# Patient Record
Sex: FEMALE | Race: WHITE | NOT HISPANIC OR LATINO | URBAN - METROPOLITAN AREA
[De-identification: names, ages, dates, MRNs, and addresses within clinical notes are randomized per-mention and may not be internally consistent; named-entity substitution may affect disease eponyms.]

---

## 2017-01-09 ENCOUNTER — OFFICE VISIT (OUTPATIENT)
Dept: PULMONOLOGY | Facility: HOSPICE | Age: 72
End: 2017-01-09
Attending: INTERNAL MEDICINE
Payer: MEDICARE

## 2017-01-09 ENCOUNTER — OFFICE VISIT (OUTPATIENT)
Dept: PULMONOLOGY | Facility: HOSPICE | Age: 72
End: 2017-01-09
Payer: MEDICARE

## 2017-01-09 VITALS
HEART RATE: 90 BPM | TEMPERATURE: 97.4 F | RESPIRATION RATE: 12 BRPM | SYSTOLIC BLOOD PRESSURE: 112 MMHG | BODY MASS INDEX: 26.66 KG/M2 | WEIGHT: 160 LBS | DIASTOLIC BLOOD PRESSURE: 82 MMHG | HEIGHT: 65 IN

## 2017-01-09 DIAGNOSIS — R06.00 DYSPNEA: ICD-10-CM

## 2017-01-09 DIAGNOSIS — J45.909 REACTIVE AIRWAY DISEASE, UNSPECIFIED ASTHMA SEVERITY, UNCOMPLICATED: ICD-10-CM

## 2017-01-09 DIAGNOSIS — Z87.891 FORMER TOBACCO USE: ICD-10-CM

## 2017-01-09 DIAGNOSIS — G47.34 NOCTURNAL HYPOXEMIA: ICD-10-CM

## 2017-01-09 DIAGNOSIS — R06.9 RESPIRATORY ABNORMALITIES: ICD-10-CM

## 2017-01-09 DIAGNOSIS — R06.00 DYSPNEA, UNSPECIFIED TYPE: ICD-10-CM

## 2017-01-09 DIAGNOSIS — G47.33 OBSTRUCTIVE SLEEP APNEA: ICD-10-CM

## 2017-01-09 DIAGNOSIS — R09.02 HYPOXEMIA: ICD-10-CM

## 2017-01-09 DIAGNOSIS — J44.9 CHRONIC OBSTRUCTIVE PULMONARY DISEASE, UNSPECIFIED COPD TYPE (HCC): ICD-10-CM

## 2017-01-09 PROCEDURE — 99214 OFFICE O/P EST MOD 30 MIN: CPT | Performed by: NURSE PRACTITIONER

## 2017-01-09 ASSESSMENT — 6 MINUTE WALK TEST (6MWT)
PERCEIVED BREATHLESSNESS AT 4 MIN: 3
SAO2 AT 3 MIN: 92
PERCEIVED BREATHLESSNESS AT 1 MIN: 2
SAO2 AT 2 MIN: 93
DISTANCE WALKED (FT): 260
SAO2 AT 4 MIN: 90
SITTING BLOOD PRESSURE: 108/62
PERCEIVED BREATHLESSNESS AT 6 MIN: 3
TOTAL REST TIME: 0
DISTANCE WALKED (FT): 240
DISTANCE WALKED (FT): 200
DISTANCE WALKED (FT): 240
PERCEIVED FATIGUE AT 2 MIN: 0
PERCEIVED FATIGUE AT 6 MIN: 0
PERCEIVED FATIGUE AT 3 MIN: 0
PERCEIVED FATIGUE AT 4 MIN: 0
DISTANCE WALKED (FT): 200
SAO2 AT 5 MIN: 92
O2 SAT PERCENT ROOM AIR: 93
BLOOD PRESSURE: LEFT ARM
HEART RATE AT 4 MIN: 106
COMMENTS: TEST ON ROOM AIR.
BLOOD PRESSURE AT 1 MIN: 110/64
HEART RATE AT 5 MIN: 106
PERCEIVED BREATHLESSNESS AT 5 MIN: 3
HEART RATE AT 6 MIN: 106
TOTAL DISTANCE WALKED: 1340
PRED MAX HR: 89
NUMBER OF RESTS: 0
SAO2 AT 2 MIN: 93
PERCEIVED BREATHLESSNESS AT 2 MIN: 2
PERCEIVED BREATHLESSNESS AT 1 MIN: 2
AMBULATES WITH O2: WITHOUT O2
PERCEIVED FATIGUE AT 1 MIN: 0
SAO2 AT 1 MIN: 90
HEART RATE AT 2 MIN: 89
SAO2 AT 1 MIN: 92
PERCEIVED FATIGUE AT 1 MIN: 0
PERCEIVED FATIGUE AT 5 MIN: 0
DISTANCE WALKED (FT): 200
PERCENT OF NORMAL WALKED: 81
HEART RATE AT 1 MIN: 106
HEART RATE AT 2 MIN: 106
SAO2 AT 6 MIN: 92
HEART RATE AT 3 MIN: 104
HEART RATE: 89
PERCEIVED BREATHLESSNESS AT 2 MIN: 2
HEART RATE AT 1 MIN: 91
PERCEIVED BREATHLESSNESS AT 3 MIN: 3
PERCEIVED FATIGUE AT 2 MIN: 0

## 2017-01-09 ASSESSMENT — PULMONARY FUNCTION TESTS
FEV1/FVC_PERCENT_CHANGE: 186
FEV1: 1.52
FVC: 2.32
FVC_PERCENT_PREDICTED: 68
FEV1/FVC: 71
FEV1_PERCENT_CHANGE: 7
FEV1: 1.73
FVC: 2.15
FVC_PREDICTED: 3.12
FEV1/FVC_PREDICTED: 75.64
FEV1/FVC_PERCENT_PREDICTED: 94
FEV1_PERCENT_CHANGE: 13
FEV1_PREDICTED: 2.36
FEV1_PREDICTED: 64
FEV1/FVC: 74.57

## 2017-01-09 NOTE — MR AVS SNAPSHOT
Porsche Arcos   2017 1:00 PM   Office Visit   MRN: [id number]    Department:  [facility]   Dept Phone:  [phone]    Description:  Female : 1945   Provider:  Chace Montez M.D.           Allergies as of 2017     No Known Allergies      You were diagnosed with     Dyspnea, unspecified type   [5274987]         Vital Signs     Smoking Status                   Former Smoker           Basic Information     Date Of Birth Sex Race Ethnicity Preferred Language    1945 Female White Non- English      Problem List              ICD-10-CM Priority Class Noted - Resolved    Chronic obstructive pulmonary disease (HCC) J44.9   2017 - Present    Obstructive sleep apnea G47.33   2017 - Present    Nocturnal hypoxemia G47.34   2017 - Present    Former tobacco use Z87.891   2017 - Present      Health Maintenance        Date Due Completion Dates    IMM DTaP/Tdap/Td Vaccine (1 - Tdap) 1965 ---    PAP SMEAR 1967 ---    COLONOSCOPY 1996 ---    IMM ZOSTER VACCINE 2006 ---    IMM PNEUMOCOCCAL 65+ (ADULT) LOW/MEDIUM RISK SERIES (1 of 2 - PCV13) 2011 ---    MAMMOGRAM 5/15/2014 5/15/2013, 2013, 3/12/2012    IMM INFLUENZA (1) 2016 ---    BONE DENSITY 3/12/2017 3/12/2012            Current Immunizations     No immunizations on file.      Below and/or attached are the medications your provider expects you to take. Review all of your home medications and newly ordered medications with your provider and/or pharmacist. Follow medication instructions as directed by your provider and/or pharmacist. Please keep your medication list with you and share with your provider. Update the information when medications are discontinued, doses are changed, or new medications (including over-the-counter products) are added; and carry medication information at all times in the event of emergency situations     Allergies:  No Known Allergies          Medications   Valid as of: January 09, 2017 -  3:59 PM    Generic Name Brand Name Tablet Size Instructions for use    Albuterol Sulfate (Nebu Soln) PROVENTIL 2.5mg/0.5ml 2.5 mg by Nebulization route every four hours as needed.        Albuterol Sulfate (Aero Soln) albuterol 108 (90 BASE) MCG/ACT Inhale 2 Puffs by mouth every four hours as needed for Shortness of Breath.        Beclomethasone Dipropionate (Aero Soln) QVAR 80 MCG/ACT Inhale 2 Puffs by mouth 2 times a day.        Calcium Carbonate (Tab) Calcium Carbonate 600 MG Take  by mouth. 1 tab by mouth weekly        Cholecalciferol (Cap) vitamin D3 5000 UNITS Take 1 Cap by mouth. 1 tab by mouth weekly        Fluticasone Furoate-Vilanterol (AEROSOL POWDER, BREATH ACTIVATED) Fluticasone Furoate-Vilanterol 100-25 MCG/INH Take 1 Inhalation by mouth every day. Rinse mouth after use.        Fluticasone Propionate (Suspension) FLONASE 50 MCG/ACT Spray 1 Spray in nose every day. 1 spray in each nostril twice a day        Phenazopyridine HCl (Tab) PYRIDIUM 200 MG Take 1 Tab by mouth 3 times a day as needed for Mild Pain.        .                 Medicines prescribed today were sent to:     [facility] [address] [place] [address]), [place] - [address]) [place] [address]    Phone: [phone] Fax: [phone]    Open 24 Hours?: No      Medication refill instructions:       If your prescription bottle indicates you have medication refills left, it is not necessary to call your provider’s office. Please contact your pharmacy and they will refill your medication.    If your prescription bottle indicates you do not have any refills left, you may request refills at any time through one of the following ways: The online Bueroservice24 system (except Urgent Care), by calling your provider’s office, or by asking your pharmacy to contact your provider’s office with a refill request. Medication refills are processed only during regular business hours and may not be available  until the next business day. Your provider may request additional information or to have a follow-up visit with you prior to refilling your medication.   *Please Note: Medication refills are assigned a new Rx number when refilled electronically. Your pharmacy may indicate that no refills were authorized even though a new prescription for the same medication is available at the pharmacy. Please request the medicine by name with the pharmacy before contacting your provider for a refill.        Other Notes About Your Plan     [facility]. [phone] Fax. [phone]             PageBites Access Code: [email or web address]  Expires: 2/8/2017  2:07 PM    PageBites  A secure, online tool to manage your health information     [employer]’s PageBites® is a secure, online tool that connects you to your personalized health information from the privacy of your home -- day or night - making it very easy for you to manage your healthcare. Once the activation process is completed, you can even access your medical information using the PageBites devika, which is available for free in the Apple Devika store or Google Play store.     PageBites provides the following levels of access (as shown below):   My Chart Features   [facility] Doctor [facility]  Specialists [facility]  Urgent  Care Non-[facility]  Primary Care  Doctor   Email your healthcare team securely and privately 24/7 X X X    Manage appointments: schedule your next appointment; view details of past/upcoming appointments X      Request prescription refills. X      View recent personal medical records, including lab and immunizations X X X X   View health record, including health history, allergies, medications X X X X   Read reports about your outpatient visits, procedures, consult and ER notes X X X X   See your discharge summary, which is a recap of your hospital and/or ER visit that includes your diagnosis, lab results, and care plan. X X       How to register for  MyChart:  1. Go to  [email or web address].  2. Click on the Sign Up Now box, which takes you to the New Member Sign Up page. You will need to provide the following information:  a. Enter your Betable Access Code exactly as it appears at the top of this page. (You will not need to use this code after you’ve completed the sign-up process. If you do not sign up before the expiration date, you must request a new code.)   b. Enter your date of birth.   c. Enter your home email address.   d. Click Submit, and follow the next screen’s instructions.  3. Create a SoFit ID. This will be your Betable login ID and cannot be changed, so think of one that is secure and easy to remember.  4. Create a SoFit password. You can change your password at any time.  5. Enter your Password Reset Question and Answer. This can be used at a later time if you forget your password.   6. Enter your e-mail address. This allows you to receive e-mail notifications when new information is available in Betable.  7. Click Sign Up. You can now view your health information.    For assistance activating your Betable account, call [phone]

## 2017-01-09 NOTE — MR AVS SNAPSHOT
"        Porsche Arcos   2017 2:20 PM   Office Visit   MRN: [id number]    Department:  [facility]   Dept Phone:  [phone]    Description:  Female : 1945   Provider:  GRICELDA Slaon           Reason for Visit     Results Sprio Results       Allergies as of 2017     No Known Allergies      You were diagnosed with     Chronic obstructive pulmonary disease, unspecified COPD type (HCC)   [4658890]       Obstructive sleep apnea   [[id number]]       Nocturnal hypoxemia   [160475]       Former tobacco use   [5899540]         Vital Signs     Blood Pressure Pulse Temperature Respirations Height Weight    112/82 mmHg 90 36.3 °C (97.4 °F) (Temporal) 12 1.651 m (5' 5\") 72.576 kg (160 lb)    Body Mass Index Smoking Status                26.63 kg/m2 Former Smoker          Basic Information     Date Of Birth Sex Race Ethnicity Preferred Language    1945 Female White Non- English      Problem List              ICD-10-CM Priority Class Noted - Resolved    Chronic obstructive pulmonary disease (HCC) J44.9   2017 - Present    Obstructive sleep apnea G47.33   2017 - Present    Nocturnal hypoxemia G47.34   2017 - Present    Former tobacco use Z87.891   2017 - Present      Health Maintenance        Date Due Completion Dates    IMM DTaP/Tdap/Td Vaccine (1 - Tdap) 1965 ---    PAP SMEAR 1967 ---    COLONOSCOPY 1996 ---    IMM ZOSTER VACCINE 2006 ---    IMM PNEUMOCOCCAL 65+ (ADULT) LOW/MEDIUM RISK SERIES (1 of 2 - PCV13) 2011 ---    MAMMOGRAM 5/15/2014 5/15/2013, 2013, 3/12/2012    IMM INFLUENZA (1) 2016 ---    BONE DENSITY 3/12/2017 3/12/2012            Current Immunizations     13-VALENT PCV PREVNAR  Incomplete      Below and/or attached are the medications your provider expects you to take. Review all of your home medications and newly ordered medications with your provider and/or pharmacist. Follow medication instructions as directed " by your provider and/or pharmacist. Please keep your medication list with you and share with your provider. Update the information when medications are discontinued, doses are changed, or new medications (including over-the-counter products) are added; and carry medication information at all times in the event of emergency situations     Allergies:  No Known Allergies          Medications  Valid as of: January 09, 2017 -  2:52 PM    Generic Name Brand Name Tablet Size Instructions for use    Albuterol Sulfate (Nebu Soln) PROVENTIL 2.5mg/0.5ml 2.5 mg by Nebulization route every four hours as needed.        Albuterol Sulfate (Aero Soln) albuterol 108 (90 BASE) MCG/ACT Inhale 2 Puffs by mouth every four hours as needed for Shortness of Breath.        Beclomethasone Dipropionate   Inhale  by mouth.        Calcium Carbonate (Tab) Calcium Carbonate 600 MG Take  by mouth. 1 tab by mouth weekly        Cholecalciferol (Cap) vitamin D3 5000 UNITS Take 1 Cap by mouth. 1 tab by mouth weekly        Fluticasone Furoate-Vilanterol (AEROSOL POWDER, BREATH ACTIVATED) Fluticasone Furoate-Vilanterol 100-25 MCG/INH Take 1 Inhalation by mouth every day. Rinse mouth after use.        Fluticasone Propionate (Suspension) FLONASE 50 MCG/ACT Spray 1 Spray in nose every day. 1 spray in each nostril twice a day        Phenazopyridine HCl (Tab) PYRIDIUM 200 MG Take 1 Tab by mouth 3 times a day as needed for Mild Pain.        .                 Medicines prescribed today were sent to:     [facility] [address] [place] [address]), [place] - [address] () [place] [address]    Phone: [phone] Fax: [phone]    Open 24 Hours?: No      Medication refill instructions:       If your prescription bottle indicates you have medication refills left, it is not necessary to call your provider’s office. Please contact your pharmacy and they will refill your medication.    If your prescription bottle indicates you do not have any  refills left, you may request refills at any time through one of the following ways: The online [employer] Network system (except Urgent Care), by calling your provider’s office, or by asking your pharmacy to contact your provider’s office with a refill request. Medication refills are processed only during regular business hours and may not be available until the next business day. Your provider may request additional information or to have a follow-up visit with you prior to refilling your medication.   *Please Note: Medication refills are assigned a new Rx number when refilled electronically. Your pharmacy may indicate that no refills were authorized even though a new prescription for the same medication is available at the pharmacy. Please request the medicine by name with the pharmacy before contacting your provider for a refill.        Your To Do List     Future Labs/Procedures Complete By Expires    AMB PULMONARY FUNCTION TEST/LAB  As directed 1/9/2018    Comments:    At Next OV    OVERNIGHT OXIMETRY  As directed 1/9/2018    Comments:    On 6L oxygen now, will call results      Other Notes About Your Plan     [facility]. [phone] Fax. [phone]             Binary Event Network Access Code: [email or web address]  Expires: 2/8/2017  2:07 PM    Binary Event Network  A secure, online tool to manage your health information     [employer]’s Binary Event Network® is a secure, online tool that connects you to your personalized health information from the privacy of your home -- day or night - making it very easy for you to manage your healthcare. Once the activation process is completed, you can even access your medical information using the Binary Event Network devika, which is available for free in the Apple Devika store or Google Play store.     Binary Event Network provides the following levels of access (as shown below):   My Chart Features   [facility] Doctor [facility]  Specialists [facility]  Urgent  Care Non-[facility]  Primary Care  Doctor   Email your healthcare team  securely and privately 24/7 X X X    Manage appointments: schedule your next appointment; view details of past/upcoming appointments X      Request prescription refills. X      View recent personal medical records, including lab and immunizations X X X X   View health record, including health history, allergies, medications X X X X   Read reports about your outpatient visits, procedures, consult and ER notes X X X X   See your discharge summary, which is a recap of your hospital and/or ER visit that includes your diagnosis, lab results, and care plan. X X       How to register for [employer]:  1. Go to  [email or web address].  2. Click on the Sign Up Now box, which takes you to the New Member Sign Up page. You will need to provide the following information:  a. Enter your [employer] Access Code exactly as it appears at the top of this page. (You will not need to use this code after you’ve completed the sign-up process. If you do not sign up before the expiration date, you must request a new code.)   b. Enter your date of birth.   c. Enter your home email address.   d. Click Submit, and follow the next screen’s instructions.  3. Create a [employer] ID. This will be your [employer] login ID and cannot be changed, so think of one that is secure and easy to remember.  4. Create a [employer] password. You can change your password at any time.  5. Enter your Password Reset Question and Answer. This can be used at a later time if you forget your password.   6. Enter your e-mail address. This allows you to receive e-mail notifications when new information is available in [employer].  7. Click Sign Up. You can now view your health information.    For assistance activating your [employer] account, call [phone]

## 2017-01-09 NOTE — PROGRESS NOTES
Chief Complaint   Patient presents with   • Results     [facility] Results        HPI:  Porsche Arcos is a 71 y.o. year old female here today for follow-up on asthma/emphysema and LUNA. Her insurance changed and she went to [facility]. We have not received any records from them.Her last office visit was 9/21/2015. History of smoking and quit 2008.  In regards to asthma/emphysema, spirometry 1/9/2007 indicates FVC 2.15 L or 60% predicted, FEV1 1.52 L 64% predicted, FEV1/FVC ratio 71, and a significant bronchodilator response especially the small airways. Patient is compliant on Qvar 80 µg 2 puffs twice a day with rare CAROLINA use. No previous imaging in notes. She declined CT scan in the past. She notes dyspnea on exertion that has remained stable but denies cough/phlegm/wheezing. She denies any changes in health over the last 1.5yrs. She uses Flonase prn for nasal congestion but sinuses are clear today. She denies fever, chills, night sweats, chest pain, or hemoptysis. She denies any GI issues. She notes occasional morning headaches but these have significantly improved with night time oxygen, she is currently on 6L. Previous CNOX on 4.5L indicated <89% oxygen sat for 319min. She has a history of severe LUNA with AHI 66 and minimum oxygen sat of 73%. She did not tolerate CPAP and declined further treatment with it. We reviewed the risk/benefit of appropriately treated sleep apnea.     She has had flu vaccine this season. She is due for pneumonia vaccine.      ROS: As per HPI and otherwise negative if not stated.    Past Medical History   Diagnosis Date   • Vitamin D deficiency    • Asthma    • LUNA (obstructive sleep apnea)    • Osteopenia        Past Surgical History   Procedure Laterality Date   • Tubal ligation       hysterectomy,salpingo-oophorectomy       Family History   Problem Relation Age of Onset   • Prostate cancer Father        Social History     Social History   • Marital Status:       "Spouse Name: N/A   • Number of Children: N/A   • Years of Education: N/A     Occupational History   • Not on file.     Social History Main Topics   • Smoking status: Former Smoker -- 1 years   • Smokeless tobacco: Not on file      Comment: started 1961  quit 2008   • Alcohol Use: 0.0 oz/week     0 Standard drinks or equivalent per week      Comment: beer and wine - occasionally   • Drug Use: No   • Sexual Activity: Not on file     Other Topics Concern   • Not on file     Social History Narrative       Allergies as of 01/09/2017   • (No Known Allergies)        @Vital signs for this encounter:  Filed Vitals:    01/09/17 1401   Height: 1.651 m (5' 5\")   Weight: 72.576 kg (160 lb)   Weight % change since last entry.: 0 %   BP: 112/82   Pulse: 90   BMI (Calculated): 26.63   Resp: 12   Temp: 36.3 °C (97.4 °F)   TempSrc: Temporal   O2 sat % room air: 93 %       Current medications as of today   Current Outpatient Prescriptions   Medication Sig Dispense Refill   • Fluticasone Furoate-Vilanterol (BREO ELLIPTA) 100-25 MCG/INH AEROSOL POWDER, BREATH ACTIVATED Take 1 Inhalation by mouth every day. Rinse mouth after use. 1 Each 5   • albuterol (VENTOLIN OR PROVENTIL) 108 (90 BASE) MCG/ACT Aero Soln inhalation aerosol Inhale 2 Puffs by mouth every four hours as needed for Shortness of Breath.     • fluticasone (FLONASE) 50 MCG/ACT nasal spray Spray 1 Spray in nose every day. 1 spray in each nostril twice a day     • Cholecalciferol (VITAMIN D3) 5000 UNITS Cap Take 1 Cap by mouth. 1 tab by mouth weekly     • Calcium Carbonate 600 MG Tab Take  by mouth. 1 tab by mouth weekly     • phenazopyridine (PYRIDIUM) 200 MG TABS Take 1 Tab by mouth 3 times a day as needed for Mild Pain. 6 Each 0   • Beclomethasone Dipropionate (QVAR INH) Inhale  by mouth.     • albuterol (PROVENTIL) 2.5mg/0.5ml NEBU 2.5 mg by Nebulization route every four hours as needed.       No current facility-administered medications for this visit.         Physical " Exam:   Gen:           Alert and oriented, No apparent distress. Mood and affect appropriate, normal interaction with examiner.  Eyes:          PERRL, EOM intact, sclere white, conjunctive moist. Glasses.  Ears:          Not examined.   Hearing:     Grossly intact.  Nose:          Normal, no lesions or deformities.  Dentition:    Good dentition.  Oropharynx:   Tongue normal, posterior pharynx without erythema or exudate.  Mallampati Classification: 3  Neck:        Supple, trachea midline, no masses.  Respiratory Effort: No intercostal retractions or use of accessory muscles.   Lung Auscultation:      Slight crackle to bilateral lobes but otherwise clear to auscultation. No wheezing or rhonchi.  CV:            Regular rate and rhythm. No murmurs, rubs or gallops.  Abd:           Not examined.   Lymphadenopathy: Not examined.  Gait and Station: Normal.  Digits and Nails: No clubbing, cyanosis, petechiae, or nodes.   Cranial Nerves: II-XII grossly intact.  Skin:        No rashes, lesions or ulcers noted.               Ext:           No cyanosis or edema.      Assessment:  1. Chronic obstructive pulmonary disease, unspecified COPD type (HCC)  OVERNIGHT OXIMETRY    PNEUMOCOCCAL CONJUGATE VACCINE 13-VALENT    Fluticasone Furoate-Vilanterol (BREO ELLIPTA) 100-25 MCG/INH AEROSOL POWDER, BREATH ACTIVATED    AMB PULMONARY FUNCTION TEST/LAB   2. Obstructive sleep apnea  OVERNIGHT OXIMETRY   3. Nocturnal hypoxemia  OVERNIGHT OXIMETRY   4. Former tobacco use         Immunizations:    Flu:2016  Pneumovax 23:declines  Prevnar 13:declines    Plan:  1. Patient declined Prevnar 13. Advised to obtain from local pharmacy.  2. Trial of Breo 100mcg 1puff QD. Rx, instructions and samples given. Advised to stop QVAR during trial. May continue Breo if beneficial. Use CAROLINA prn.  3. Discussed sleep and respiratory hygiene.  4. DME CNOX on 6L o2. May call results to patient.  5. Breathing exercises reviewed.  6. Follow up in 1 year with PFT,  sooner if needed.

## 2017-01-09 NOTE — MR AVS SNAPSHOT
Porsche Arcos   2017 1:30 PM   Office Visit   MRN: [id number]    Department:  [facility]   Dept Phone:  [phone]    Description:  Female : 1945   Provider:  Chace Montez M.D.           Allergies as of 2017     No Known Allergies      You were diagnosed with     Respiratory abnormalities   [694200]       Dyspnea   [673233]       Hypoxemia   [799.02.ICD-9-CM]       Reactive airway disease, unspecified asthma severity, uncomplicated   [5191170]       Obstructive sleep apnea   [398203]       Dyspnea, unspecified type   [2531984]         Vital Signs     Smoking Status                   Former Smoker           Basic Information     Date Of Birth Sex Race Ethnicity Preferred Language    1945 Female White Non- English      Your appointments     2017  2:20 PM   Established Patient Pul with GRICELDA Sloan   [facility] (--)    [address]  [address]  [facility] [id number] [phone]              Health Maintenance        Date Due Completion Dates    IMM DTaP/Tdap/Td Vaccine (1 - Tdap) 1965 ---    PAP SMEAR 1967 ---    COLONOSCOPY 1996 ---    IMM ZOSTER VACCINE 2006 ---    IMM PNEUMOCOCCAL 65+ (ADULT) LOW/MEDIUM RISK SERIES (1 of 2 - PCV13) 2011 ---    MAMMOGRAM 5/15/2014 5/15/2013, 2013, 3/12/2012    IMM INFLUENZA (1) 2016 ---    BONE DENSITY 3/12/2017 3/12/2012            Current Immunizations     No immunizations on file.      Below and/or attached are the medications your provider expects you to take. Review all of your home medications and newly ordered medications with your provider and/or pharmacist. Follow medication instructions as directed by your provider and/or pharmacist. Please keep your medication list with you and share with your provider. Update the information when medications are discontinued, doses are changed, or new medications (including over-the-counter products)  are added; and carry medication information at all times in the event of emergency situations     Allergies:  No Known Allergies          Medications  Valid as of: January 09, 2017 -  2:07 PM    Generic Name Brand Name Tablet Size Instructions for use    Albuterol Sulfate (Nebu Soln) PROVENTIL 2.5mg/0.5ml 2.5 mg by Nebulization route every four hours as needed.        Albuterol Sulfate (Aero Soln) albuterol 108 (90 BASE) MCG/ACT Inhale 2 Puffs by mouth every four hours as needed for Shortness of Breath.        Beclomethasone Dipropionate   Inhale  by mouth.        Calcium Carbonate (Tab) Calcium Carbonate 600 MG Take  by mouth. 1 tab by mouth weekly        Cholecalciferol (Cap) vitamin D3 5000 UNITS Take 1 Cap by mouth. 1 tab by mouth weekly        Fluticasone Propionate (Suspension) FLONASE 50 MCG/ACT Spray 1 Spray in nose every day. 1 spray in each nostril twice a day        Phenazopyridine HCl (Tab) PYRIDIUM 200 MG Take 1 Tab by mouth 3 times a day as needed for Mild Pain.        .                 Medicines prescribed today were sent to:     [facility] [address] [place] [address]), [place] - [address]) [place] [address]    Phone: [phone] Fax: [phone]    Open 24 Hours?: No      Medication refill instructions:       If your prescription bottle indicates you have medication refills left, it is not necessary to call your provider’s office. Please contact your pharmacy and they will refill your medication.    If your prescription bottle indicates you do not have any refills left, you may request refills at any time through one of the following ways: The online [employer] system (except Urgent Care), by calling your provider’s office, or by asking your pharmacy to contact your provider’s office with a refill request. Medication refills are processed only during regular business hours and may not be available until the next business day. Your provider may request additional information or  to have a follow-up visit with you prior to refilling your medication.   *Please Note: Medication refills are assigned a new Rx number when refilled electronically. Your pharmacy may indicate that no refills were authorized even though a new prescription for the same medication is available at the pharmacy. Please request the medicine by name with the pharmacy before contacting your provider for a refill.           [employer] Access Code: [email or web address]  Expires: 2/8/2017  2:07 PM    [employer]  A secure, online tool to manage your health information     [employer]s [employer]® is a secure, online tool that connects you to your personalized health information from the privacy of your home -- day or night - making it very easy for you to manage your healthcare. Once the activation process is completed, you can even access your medical information using the [employer] devika, which is available for free in the Apple Devika store or Google Play store.     [employer] provides the following levels of access (as shown below):   My Chart Features   [facility] Primary Care Doctor [facility]  Specialists [facility]  Urgent  Care Non-[facility]  Primary Care  Doctor   Email your healthcare team securely and privately 24/7 X X X    Manage appointments: schedule your next appointment; view details of past/upcoming appointments X      Request prescription refills. X      View recent personal medical records, including lab and immunizations X X X X   View health record, including health history, allergies, medications X X X X   Read reports about your outpatient visits, procedures, consult and ER notes X X X X   See your discharge summary, which is a recap of your hospital and/or ER visit that includes your diagnosis, lab results, and care plan. X X       How to register for [employer]:  1. Go to  [email or web address].  2. Click on the Sign Up Now box, which takes you to the New Member Sign Up page. You will need to provide the following  information:  a. Enter your [employer] Access Code exactly as it appears at the top of this page. (You will not need to use this code after you’ve completed the sign-up process. If you do not sign up before the expiration date, you must request a new code.)   b. Enter your date of birth.   c. Enter your home email address.   d. Click Submit, and follow the next screen’s instructions.  3. Create a [employer] ID. This will be your [employer] login ID and cannot be changed, so think of one that is secure and easy to remember.  4. Create a [employer] password. You can change your password at any time.  5. Enter your Password Reset Question and Answer. This can be used at a later time if you forget your password.   6. Enter your e-mail address. This allows you to receive e-mail notifications when new information is available in [employer].  7. Click Sign Up. You can now view your health information.    For assistance activating your [employer] account, call [phone]

## 2017-01-09 NOTE — PROCEDURES
Test on Room Air  6 minute walking test done in room air.  The patient was able to walk 1340 feet or 81% normal walked  Pretest alto saturation was 93%  O2 saturation remained above 90% with the lowest of 90%.  Maximum heart rate was 106 bpm

## 2017-01-09 NOTE — PROCEDURES
Good patient effort & cooperation.  The results of this test meet the ATS standards for acceptability and repeatability.  Test was performed on the [employer]/D spirometry system.  Predicted values were N-joan.  A bronchodilator of Ventolin HFA - 2 puffs with a spacer was administered the patient.    SPIROMETRY:  1. FVC was 2.15 L, 68% of predicted  2. FEV1 was 1.52 L, 64% of predicted. It improved to 1.73 L, 7030% of predicted after bronchodilators.  3. FEV1/FVC ratio was 75%.  4. Flow-volume loop was scooped consistent with airway obstruction.  5. There was significant response to bronchodilator with FEV1 increased by 200 mL, 13% change from baseline.      IMPRESSION:  Spirometry testing consistent with airway obstructions however these findings don't meet the go

## 2017-01-24 ENCOUNTER — OFFICE VISIT (OUTPATIENT)
Dept: MEDICAL GROUP | Facility: PHYSICIAN GROUP | Age: 72
End: 2017-01-24
Payer: MEDICARE

## 2017-01-24 VITALS
SYSTOLIC BLOOD PRESSURE: 102 MMHG | HEIGHT: 65 IN | DIASTOLIC BLOOD PRESSURE: 68 MMHG | TEMPERATURE: 98.2 F | WEIGHT: 160 LBS | RESPIRATION RATE: 14 BRPM | HEART RATE: 76 BPM | BODY MASS INDEX: 26.66 KG/M2 | OXYGEN SATURATION: 94 %

## 2017-01-24 DIAGNOSIS — G47.34 NOCTURNAL HYPOXEMIA: ICD-10-CM

## 2017-01-24 DIAGNOSIS — B37.31 YEAST VAGINITIS: ICD-10-CM

## 2017-01-24 DIAGNOSIS — Z13.820 SCREENING FOR OSTEOPOROSIS: ICD-10-CM

## 2017-01-24 DIAGNOSIS — J44.9 CHRONIC OBSTRUCTIVE PULMONARY DISEASE, UNSPECIFIED COPD TYPE (HCC): ICD-10-CM

## 2017-01-24 DIAGNOSIS — Z00.00 WELLNESS EXAMINATION: ICD-10-CM

## 2017-01-24 DIAGNOSIS — G47.33 OBSTRUCTIVE SLEEP APNEA: ICD-10-CM

## 2017-01-24 DIAGNOSIS — Z12.39 SCREENING FOR BREAST CANCER: ICD-10-CM

## 2017-01-24 PROCEDURE — 0518F FALL PLAN OF CARE DOCD: CPT | Mod: 8P | Performed by: NURSE PRACTITIONER

## 2017-01-24 PROCEDURE — 3017F COLORECTAL CA SCREEN DOC REV: CPT | Mod: 8P | Performed by: NURSE PRACTITIONER

## 2017-01-24 PROCEDURE — 3014F SCREEN MAMMO DOC REV: CPT | Mod: 8P | Performed by: NURSE PRACTITIONER

## 2017-01-24 PROCEDURE — G8482 FLU IMMUNIZE ORDER/ADMIN: HCPCS | Performed by: NURSE PRACTITIONER

## 2017-01-24 PROCEDURE — 4040F PNEUMOC VAC/ADMIN/RCVD: CPT | Mod: 8P | Performed by: NURSE PRACTITIONER

## 2017-01-24 PROCEDURE — 1036F TOBACCO NON-USER: CPT | Performed by: NURSE PRACTITIONER

## 2017-01-24 PROCEDURE — 1100F PTFALLS ASSESS-DOCD GE2>/YR: CPT | Performed by: NURSE PRACTITIONER

## 2017-01-24 PROCEDURE — G8420 CALC BMI NORM PARAMETERS: HCPCS | Performed by: NURSE PRACTITIONER

## 2017-01-24 PROCEDURE — 99214 OFFICE O/P EST MOD 30 MIN: CPT | Performed by: NURSE PRACTITIONER

## 2017-01-24 PROCEDURE — G8432 DEP SCR NOT DOC, RNG: HCPCS | Performed by: NURSE PRACTITIONER

## 2017-01-24 PROCEDURE — 3288F FALL RISK ASSESSMENT DOCD: CPT | Performed by: NURSE PRACTITIONER

## 2017-01-24 ASSESSMENT — PATIENT HEALTH QUESTIONNAIRE - PHQ9: CLINICAL INTERPRETATION OF PHQ2 SCORE: 0

## 2017-01-24 NOTE — MR AVS SNAPSHOT
"        Porsche Rodríguezwell   2017 4:40 PM   Office Visit   MRN: [id number]    Department:  [facility]   Dept Phone:  [phone]    Description:  Female : 1945   Provider:  GRICELDA Moses           Reason for Visit     Establish Care New Patient       Allergies as of 2017     No Known Allergies      You were diagnosed with     Yeast vaginitis   [186649]       Wellness examination   [8169773]       Screening for breast cancer   [107182]       Screening for osteoporosis   [440379]         Vital Signs     Blood Pressure Pulse Temperature Respirations Height Weight    102/68 mmHg 76 36.8 °C (98.2 °F) 14 1.651 m (5' 5\") 72.576 kg (160 lb)    Body Mass Index Oxygen Saturation Breastfeeding? Smoking Status          26.63 kg/m2 94% No Former Smoker        Basic Information     Date Of Birth Sex Race Ethnicity Preferred Language    1945 Female White Non- English      Your appointments     2017 10:00 AM   Established Patient with GRICELDA Moses   [facility] (--)    [address]  [address]  [facility] [id number] [phone]           You will be receiving a confirmation call a few days before your appointment from our automated call confirmation system.              Problem List              ICD-10-CM Priority Class Noted - Resolved    Chronic obstructive pulmonary disease (CMS-HCC) J44.9   2017 - Present    Obstructive sleep apnea G47.33   2017 - Present    Nocturnal hypoxemia G47.34   2017 - Present    Former tobacco use Z87.891   2017 - Present    Yeast vaginitis B37.3   2017 - Present      Health Maintenance        Date Due Completion Dates    MAMMOGRAM 5/15/2014 5/15/2013, 2013, 3/12/2012    COLON CANCER SCREENING ANNUAL FIT 2018 (Originally 1945) ---    IMM PNEUMOCOCCAL 65+ (ADULT) LOW/MEDIUM RISK SERIES (1 of 2 - PCV13) 2018 (Originally 2011) ---    IMM DTaP/Tdap/Td Vaccine " (1 - Tdap) 1/1/2018 (Originally [birth year]26/1965) ---    BONE DENSITY 3/12/2017 3/12/2012            Current Immunizations     Influenza Vaccine Quad Inj (Preserved) 11/24/2016      Below and/or attached are the medications your provider expects you to take. Review all of your home medications and newly ordered medications with your provider and/or pharmacist. Follow medication instructions as directed by your provider and/or pharmacist. Please keep your medication list with you and share with your provider. Update the information when medications are discontinued, doses are changed, or new medications (including over-the-counter products) are added; and carry medication information at all times in the event of emergency situations     Allergies:  No Known Allergies          Medications  Valid as of: January 24, 2017 -  5:15 PM    Generic Name Brand Name Tablet Size Instructions for use    Albuterol Sulfate (Aero Soln) albuterol 108 (90 BASE) MCG/ACT Inhale 2 Puffs by mouth every four hours as needed for Shortness of Breath.        Beclomethasone Dipropionate (Aero Soln) QVAR 80 MCG/ACT Inhale 2 Puffs by mouth 2 times a day.        Calcium Carbonate (Tab) Calcium Carbonate 600 MG Take  by mouth. 1 tab by mouth weekly        Cholecalciferol (Cap) vitamin D3 5000 UNITS Take 1 Cap by mouth. 1 tab by mouth weekly        Fluticasone Furoate-Vilanterol (AEROSOL POWDER, BREATH ACTIVATED) Fluticasone Furoate-Vilanterol 100-25 MCG/INH Take 1 Inhalation by mouth every day. Rinse mouth after use.        .                 Medicines prescribed today were sent to:     [facility] [address] [place] [address] (), [place] - [address]    [address] () [place] [address]    Phone: [phone] Fax: [phone]    Open 24 Hours?: No      Medication refill instructions:       If your prescription bottle indicates you have medication refills left, it is not necessary to call your provider’s office. Please contact your pharmacy and they  will refill your medication.    If your prescription bottle indicates you do not have any refills left, you may request refills at any time through one of the following ways: The online [employer] system (except Urgent Care), by calling your provider’s office, or by asking your pharmacy to contact your provider’s office with a refill request. Medication refills are processed only during regular business hours and may not be available until the next business day. Your provider may request additional information or to have a follow-up visit with you prior to refilling your medication.   *Please Note: Medication refills are assigned a new Rx number when refilled electronically. Your pharmacy may indicate that no refills were authorized even though a new prescription for the same medication is available at the pharmacy. Please request the medicine by name with the pharmacy before contacting your provider for a refill.        Your To Do List     Future Labs/Procedures Complete By Expires    COMP METABOLIC PANEL  As directed 1/24/2018    DS-BONE DENSITY STUDY (DEXA)  As directed 1/24/2018    LIPID PROFILE  As directed 1/24/2018    VITAMIN D,25 HYDROXY  As directed 1/24/2018      Other Notes About Your Plan     [facility] [place]. [phone] Fax. [phone]             [employer] Access Code: [email or web address]  Expires: 2/8/2017  2:07 PM    [employer]  A secure, online tool to manage your health information     [employer]s [employer]® is a secure, online tool that connects you to your personalized health information from the privacy of your home -- day or night - making it very easy for you to manage your healthcare. Once the activation process is completed, you can even access your medical information using the [employer] devika, which is available for free in the Apple Devika store or Google Play store.     [employer] provides the following levels of access (as shown below):   My Chart Features   [facility] Primary [facility] Doctor  [facility]  Specialists [facility]  Urgent  Care Non-[facility]  Primary Care  Doctor   Email your healthcare team securely and privately 24/7 X X X    Manage appointments: schedule your next appointment; view details of past/upcoming appointments X      Request prescription refills. X      View recent personal medical records, including lab and immunizations X X X X   View health record, including health history, allergies, medications X X X X   Read reports about your outpatient visits, procedures, consult and ER notes X X X X   See your discharge summary, which is a recap of your hospital and/or ER visit that includes your diagnosis, lab results, and care plan. X X       How to register for [employer]:  1. Go to  [email or web address].  2. Click on the Sign Up Now box, which takes you to the New Member Sign Up page. You will need to provide the following information:  a. Enter your [employer] Access Code exactly as it appears at the top of this page. (You will not need to use this code after you’ve completed the sign-up process. If you do not sign up before the expiration date, you must request a new code.)   b. Enter your date of birth.   c. Enter your home email address.   d. Click Submit, and follow the next screen’s instructions.  3. Create a [employer] ID. This will be your [employer] login ID and cannot be changed, so think of one that is secure and easy to remember.  4. Create a [employer] password. You can change your password at any time.  5. Enter your Password Reset Question and Answer. This can be used at a later time if you forget your password.   6. Enter your e-mail address. This allows you to receive e-mail notifications when new information is available in [employer].  7. Click Sign Up. You can now view your health information.    For assistance activating your [employer] account, call [phone]

## 2017-01-25 NOTE — ASSESSMENT & PLAN NOTE
Chronic in nature. Stable. Patient is followed by nephrogram with pulmonology who manages her medications. Patient states that she is stable on Breo and her albuterol rescue inhaler at this time. Patient denies side effects from inhalers including heart palpitations. Patient states she is doing well and denies shortness of breath, wheezing at this time.

## 2017-01-25 NOTE — ASSESSMENT & PLAN NOTE
Patient states that over the last 3 days she has noted significant white, curd-like discharge from her vagina. Patient states that recently she was taking antibiotics for bladder infection. Patient does have some irritation and burning in her vaginal area. A minute this point. Patient is encouraged to obtain over-the-counter miconazole and use that. If symptoms worsen or do not resolve patient will contact this provider who will provide Diflucan at that time.

## 2017-01-25 NOTE — ASSESSMENT & PLAN NOTE
Chronic in nature. Stable. Patient is managed by pulmonology. Patient states that she does have CPAP machine at night.

## 2017-01-25 NOTE — PROGRESS NOTES
Chief Complaint   Patient presents with   • Establish Care     New Patient        HISTORY OF THE PRESENT ILLNESS: This is a 71 y.o. female new patient to our practice. This pleasant patient is here today to establish care    Chronic obstructive pulmonary disease (CMS-[facility])  Chronic in nature. Stable. Patient is followed by nephrogram with pulmonology who manages her medications. Patient states that she is stable on Corinne and her albuterol rescue inhaler at this time. Patient denies side effects from inhalers including heart palpitations. Patient states she is doing well and denies shortness of breath, wheezing at this time.    Obstructive sleep apnea  Chronic in nature. Stable. Patient is managed by pulmonology. Patient states that she does have CPAP machine at night.     Nocturnal hypoxemia  Chronic in nature. Stable, managed by pulmonology. Patient states that she is at 6 L of O2 at night.    Yeast vaginitis  Patient states that over the last 3 days she has noted significant white, curd-like discharge from her vagina. Patient states that recently she was taking antibiotics for bladder infection. Patient does have some irritation and burning in her vaginal area. A minute this point. Patient is encouraged to obtain over-the-counter miconazole and use that. If symptoms worsen or do not resolve patient will contact this provider who will provide Diflucan at that time.      Past Medical History   Diagnosis Date   • Vitamin D deficiency    • Asthma    • LUNA (obstructive sleep apnea)    • Osteopenia        Past Surgical History   Procedure Laterality Date   • Tubal ligation       hysterectomy,salpingo-oophorectomy       Family Status   Relation Status Death Age   • Father       Family History   Problem Relation Age of Onset   • Prostate cancer Father        Social History   Substance Use Topics   • Smoking status: Former Smoker -- 1 years     Quit date: 10/24/2008   • Smokeless tobacco: Never Used      Comment:  started 1961  quit 2008   • Alcohol Use: 0.0 oz/week     0 Standard drinks or equivalent per week      Comment: beer and wine - occasionally       Allergies: Review of patient's allergies indicates no known allergies.    Current Outpatient Prescriptions Ordered in [facility]   Medication Sig Dispense Refill   • Fluticasone Furoate-Vilanterol (BREO ELLIPTA) 100-25 MCG/INH AEROSOL POWDER, BREATH ACTIVATED Take 1 Inhalation by mouth every day. Rinse mouth after use. 1 Each 5   • beclomethasone (QVAR) 80 MCG/ACT inhaler Inhale 2 Puffs by mouth 2 times a day. 7.3 g 6   • albuterol (VENTOLIN OR PROVENTIL) 108 (90 BASE) MCG/ACT Aero Soln inhalation aerosol Inhale 2 Puffs by mouth every four hours as needed for Shortness of Breath.     • Cholecalciferol (VITAMIN D3) 5000 UNITS Cap Take 1 Cap by mouth. 1 tab by mouth weekly     • Calcium Carbonate 600 MG Tab Take  by mouth. 1 tab by mouth weekly       No current Epic-ordered facility-administered medications on file.       Review of Systems   Constitutional:  Negative for fever, chills, weight loss and malaise/fatigue.   HENT:  Negative for ear pain, nosebleeds, congestion, sore throat and neck pain.    Eyes:  Negative for blurred vision.   Respiratory:  Negative for cough, sputum production, shortness of breath and wheezing.    Cardiovascular:  Negative for chest pain, palpitations, orthopnea and leg swelling.   Gastrointestinal:  Negative for heartburn, nausea, vomiting and abdominal pain.   Genitourinary:  Negative for dysuria, urgency and frequency. Positive Itching, irritations discharge  Musculoskeletal:  Negative for myalgias, back pain and joint pain.   Skin:  Negative for rash and itching.   Neurological:  Negative for dizziness, tingling, tremors, sensory change, focal weakness and headaches.   Endo/Heme/Allergies:  Does not bruise/bleed easily.   Psychiatric/Behavioral:  Negative for depression, anxiety, or memory loss.     All other systems reviewed and are negative  "except as in HPI.    Exam: Blood pressure 102/68, pulse 76, temperature 36.8 °C (98.2 °F), resp. rate 14, height 1.651 m (5' 5\"), weight 72.576 kg (160 lb), SpO2 94 %, not currently breastfeeding.  General:  Normal appearing. No distress.  HEENT:  Normocephalic. Eyes conjunctiva clear lids without ptosis, pupils equal and reactive to light accommodation, ears normal shape and contour, canals are clear bilaterally, tympanic membranes are benign, nasal mucosa benign, oropharynx is without erythema, edema or exudates.   Neck:  Supple without JVD or bruit. Thyroid is not enlarged.  Pulmonary:  Clear to ausculation, diminished at the bases.  Normal effort. No rales, ronchi, or wheezing.  Cardiovascular:  Regular rate and rhythm without murmur. Carotid and radial pulses are intact and equal bilaterally.  Abdomen:  Soft, nontender, nondistended. Normal bowel sounds. Liver and spleen are not palpable  Neurologic:  Grossly nonfocal  Lymph:  No cervical, supraclavicular or axillary lymph nodes are palpable  Skin:  Warm and dry.  No obvious lesions.  Musculoskeletal:  Normal gait. No extremity cyanosis, clubbing, or edema.  Psych:  Normal mood and affect. Alert and oriented x3. Judgment and insight is normal.    Medical decision making:  Porsche is a generally well-appearing 71-year-old female patient here today to establish care. Patient COPD and other lung concerns are monitored and well cared for by pulmonology. Patient is stable on current medications. Patient needs updated mammogram, DEXA scan. He senses that previous DEXA scans were within normal limits and that she has never taken medication for this issue but that she was recommended calcium and vitamin D supplements in the past. Vitamin D level is ordered to assess for vitamin D deficiency at this time. Urinalysis is ordered as patient states that she has had on and off recurrent possible symptoms of UTI. metabolic panel and lipid profile are ordered for assessment " at this time. Patient is instructed to fast 8 hours prior to obtaining this test but that is okay. Patient will follow-up in 6 months or earlier depending on lab results. ED precautions: seek emergency evaluation for symptoms including but not limited to : crushing chest pain, chest pain associated with difficulty breathing, nausea, or sweats, heart rate irregular or too fast to count.       Please note that this dictation was created using voice recognition software. I have made every reasonable attempt to correct obvious errors, but I expect that there are errors of grammar and possibly content that I did not discover before finalizing the note.      Assessment/Plan  1. Yeast vaginitis  URINALYSIS,CULTURE IF INDICATED   2. Wellness examination  COMP METABOLIC PANEL    LIPID PROFILE    VITAMIN D,25 HYDROXY   3. Screening for breast cancer  MA-SCREEN MAMMO W/CAD-BILAT   4. Screening for osteoporosis  DS-BONE DENSITY STUDY (DEXA)   5. Chronic obstructive pulmonary disease, unspecified COPD type (CMS-HCC)     6. Obstructive sleep apnea     7. Nocturnal hypoxemia           I have placed the below orders and discussed them with an approved delegating provider. The MA is performing the below orders under the direction of Dr. Daniels.

## 2017-01-25 NOTE — ASSESSMENT & PLAN NOTE
Chronic in nature. Stable, managed by pulmonology. Patient states that she is at 6 L of O2 at night.

## 2017-01-27 DIAGNOSIS — R30.0 DYSURIA: ICD-10-CM

## 2017-01-30 ENCOUNTER — TELEPHONE (OUTPATIENT)
Dept: PULMONOLOGY | Facility: HOSPICE | Age: 72
End: 2017-01-30

## 2017-01-30 DIAGNOSIS — G47.30 SLEEP-DISORDERED BREATHING: ICD-10-CM

## 2017-01-30 NOTE — TELEPHONE ENCOUNTER
Please let patient know CNOX indicated sleep apnea that causes low oxygen levels. Advise treating sleep apnea to correct low oxygen levels. Do not advise increasing oxygen.

## 2017-02-02 NOTE — TELEPHONE ENCOUNTER
Pt called in and the PT would like to speak with Claire Mckeon about treatment options for her Sleep Apena.

## 2017-02-02 NOTE — TELEPHONE ENCOUNTER
Pt will need a new DX sleep study - either Home sleep study or in lab study   You are correct they are only good for 1 yr if treatment is not initiated

## 2017-02-02 NOTE — TELEPHONE ENCOUNTER
Patient's sleep study indicated severe sleep apnea with low oxygen levels in the past. The only treatment that will correct this is CPAP. Because study was done >1yr ago, she will need updated sleep study to be able to start therapy. Please let her know this is the only treatment option. I understand she cannot afford copay for OV at this time, but we could order testing and have her follow up afterwards.     Yudith, can you verify which study she needs to qualify for treatment?

## 2017-02-13 ENCOUNTER — HOSPITAL ENCOUNTER (OUTPATIENT)
Dept: LAB | Facility: MEDICAL CENTER | Age: 72
End: 2017-02-13
Attending: NURSE PRACTITIONER
Payer: MEDICARE

## 2017-02-13 DIAGNOSIS — R30.0 DYSURIA: ICD-10-CM

## 2017-02-13 DIAGNOSIS — Z00.00 WELLNESS EXAMINATION: ICD-10-CM

## 2017-02-13 LAB
25(OH)D3 SERPL-MCNC: 25 NG/ML (ref 30–100)
ALBUMIN SERPL BCP-MCNC: 4.2 G/DL (ref 3.2–4.9)
ALBUMIN/GLOB SERPL: 1.6 G/DL
ALP SERPL-CCNC: 87 U/L (ref 30–99)
ALT SERPL-CCNC: 19 U/L (ref 2–50)
ANION GAP SERPL CALC-SCNC: 7 MMOL/L (ref 0–11.9)
APPEARANCE UR: ABNORMAL
AST SERPL-CCNC: 18 U/L (ref 12–45)
BACTERIA #/AREA URNS HPF: ABNORMAL /HPF
BILIRUB SERPL-MCNC: 0.6 MG/DL (ref 0.1–1.5)
BILIRUB UR QL STRIP.AUTO: NEGATIVE
BUN SERPL-MCNC: 15 MG/DL (ref 8–22)
CALCIUM SERPL-MCNC: 9.4 MG/DL (ref 8.5–10.5)
CHLORIDE SERPL-SCNC: 105 MMOL/L (ref 96–112)
CHOLEST SERPL-MCNC: 191 MG/DL (ref 100–199)
CO2 SERPL-SCNC: 28 MMOL/L (ref 20–33)
COLOR UR AUTO: ABNORMAL
CREAT SERPL-MCNC: 0.75 MG/DL (ref 0.5–1.4)
CULTURE IF INDICATED INDCX: YES UA CULTURE
EPITHELIAL CELLS 1715: ABNORMAL /HPF
GLOBULIN SER CALC-MCNC: 2.7 G/DL (ref 1.9–3.5)
GLUCOSE SERPL-MCNC: 87 MG/DL (ref 65–99)
GLUCOSE UR STRIP.AUTO-MCNC: NEGATIVE MG/DL
HDLC SERPL-MCNC: 62 MG/DL
KETONES UR STRIP.AUTO-MCNC: NEGATIVE MG/DL
LDLC SERPL CALC-MCNC: 112 MG/DL
LEUKOCYTE ESTERASE UR QL STRIP.AUTO: ABNORMAL
MICRO URNS: ABNORMAL
NITRITE UR QL STRIP.AUTO: POSITIVE
PH UR: 6.5 [PH]
POTASSIUM SERPL-SCNC: 4.4 MMOL/L (ref 3.6–5.5)
PROT SERPL-MCNC: 6.9 G/DL (ref 6–8.2)
PROT UR QL STRIP: NEGATIVE MG/DL
RBC #/AREA URNS HPF: ABNORMAL /HPF
RBC UR QL AUTO: NEGATIVE
SODIUM SERPL-SCNC: 140 MMOL/L (ref 135–145)
SP GR UR STRIP.AUTO: 1.02
TRIGL SERPL-MCNC: 83 MG/DL (ref 0–149)
WBC #/AREA URNS HPF: ABNORMAL /HPF

## 2017-02-13 PROCEDURE — 81001 URINALYSIS AUTO W/SCOPE: CPT

## 2017-02-13 PROCEDURE — 82306 VITAMIN D 25 HYDROXY: CPT

## 2017-02-13 PROCEDURE — 87077 CULTURE AEROBIC IDENTIFY: CPT

## 2017-02-13 PROCEDURE — 80053 COMPREHEN METABOLIC PANEL: CPT

## 2017-02-13 PROCEDURE — 80061 LIPID PANEL: CPT

## 2017-02-13 PROCEDURE — 36415 COLL VENOUS BLD VENIPUNCTURE: CPT

## 2017-02-13 PROCEDURE — 87186 SC STD MICRODIL/AGAR DIL: CPT

## 2017-02-13 PROCEDURE — 87086 URINE CULTURE/COLONY COUNT: CPT

## 2017-02-14 DIAGNOSIS — N30.00 ACUTE CYSTITIS WITHOUT HEMATURIA: ICD-10-CM

## 2017-02-14 RX ORDER — SULFAMETHOXAZOLE AND TRIMETHOPRIM 800; 160 MG/1; MG/1
1 TABLET ORAL EVERY 12 HOURS
Qty: 6 TAB | Refills: 0 | Status: SHIPPED | OUTPATIENT
Start: 2017-02-14 | End: 2017-02-17

## 2017-02-14 RX ORDER — PHENAZOPYRIDINE HYDROCHLORIDE 200 MG/1
200 TABLET, FILM COATED ORAL 3 TIMES DAILY
Qty: 6 TAB | Refills: 0 | Status: SHIPPED | OUTPATIENT
Start: 2017-02-14 | End: 2017-02-16

## 2017-02-15 ENCOUNTER — TELEPHONE (OUTPATIENT)
Dept: MEDICAL GROUP | Facility: PHYSICIAN GROUP | Age: 72
End: 2017-02-15

## 2017-02-15 LAB
BACTERIA UR CULT: ABNORMAL
SIGNIFICANT IND 70042: ABNORMAL
SOURCE SOURCE: ABNORMAL

## 2017-02-15 NOTE — TELEPHONE ENCOUNTER
----- Message from GRICELDA Moses sent at 2/14/2017  6:48 PM PST -----  Please call pt and give lab results: Comprehensive metabolic panel is within normal limits. Urinalysis does indicate that you have a urinary tract infection, I will send Bactrim 160/800 mg please take this twice daily for 3 days, follow up if symptoms worsen or do not improve. I will also send Pyridium 200mg up to 3 times daily which is a medication to relieve pain, it may turn your urine orange, you do not have to take pyridium.

## 2017-02-15 NOTE — TELEPHONE ENCOUNTER
1. Caller Name: Porsche Arcos                                         Call Back Number: [phone] (home)     2. Message: Spoke with patient  and told me to call back after 4 pm.     3. Patient approves office to leave a detailed voicemail/MyChart message: N\A

## 2017-02-16 ENCOUNTER — PATIENT MESSAGE (OUTPATIENT)
Dept: MEDICAL GROUP | Facility: PHYSICIAN GROUP | Age: 72
End: 2017-02-16

## 2017-02-16 NOTE — TELEPHONE ENCOUNTER
1. Caller Name: Porsche Arcos                                         Call Back Number: [phone] (home)     2. Message: Called patient, patient notified me that she saw results on mychart and spoke with her provider about results.     3. Patient approves office to leave a detailed voicemail/MyChart message: N\A

## 2017-02-16 NOTE — TELEPHONE ENCOUNTER
1. Caller Name: Porsche Arcos                                         Call Back Number: [phone] (home)       Patient approves a detailed voicemail message: N\A    Unable to reach patient, spoke with  patient is still not home.

## 2017-02-16 NOTE — TELEPHONE ENCOUNTER
Spoke with patient over the phone. Discussed results of urinalysis as well as a slightly elevated LDL. Patient will go today to the pharmacy to  Bactrim prescription and complete the course of antibiotics. All patient questions are answered at this time. Discussed with patient that if she continues to have symptoms or any other concerns to follow up so we can retest her urine.

## 2017-05-08 ENCOUNTER — HOSPITAL ENCOUNTER (OUTPATIENT)
Dept: RADIOLOGY | Facility: MEDICAL CENTER | Age: 72
End: 2017-05-08
Attending: NURSE PRACTITIONER
Payer: MEDICARE

## 2017-05-08 ENCOUNTER — TELEPHONE (OUTPATIENT)
Dept: MEDICAL GROUP | Facility: PHYSICIAN GROUP | Age: 72
End: 2017-05-08

## 2017-05-08 DIAGNOSIS — Z13.820 SCREENING FOR OSTEOPOROSIS: ICD-10-CM

## 2017-05-08 PROCEDURE — 77080 DXA BONE DENSITY AXIAL: CPT

## 2017-05-08 PROCEDURE — 77063 BREAST TOMOSYNTHESIS BI: CPT

## 2017-05-08 NOTE — TELEPHONE ENCOUNTER
Phone Number Called: [phone] (home)     Message: LVM to call back.     Left Message for patient to call back: yes

## 2017-05-08 NOTE — TELEPHONE ENCOUNTER
Called and spoke with patient. Patient said she saw her results online with [email or web address]. Patient did not want me to read what Silvino said. WILLIAN

## 2017-05-08 NOTE — TELEPHONE ENCOUNTER
----- Message from GRICELDA Moses sent at 5/8/2017 11:58 AM PDT -----  Please call pt and give lab results: Bone Density Scan shows Osteoporsis and indicates high fracture risk. We should schedule a follow-up to discuss the possibility of medication.

## 2017-05-08 NOTE — TELEPHONE ENCOUNTER
----- Message from GRICELDA Moses sent at 5/8/2017 11:52 AM PDT -----  Please call patient: Recent mammogram was normal. No signs of malignancy. I recommend re-checking in 1 year.

## 2017-06-02 ENCOUNTER — TELEPHONE (OUTPATIENT)
Dept: MEDICAL GROUP | Facility: PHYSICIAN GROUP | Age: 72
End: 2017-06-02

## 2017-06-02 NOTE — TELEPHONE ENCOUNTER
ESTABLISHED PATIENT PRE-VISIT PLANNING     Note: Patient will not be contacted if there is no indication to call.     1.  Reviewed notes from the last few office visits within the medical group: Yes    2.  If any orders were placed at last visit or intended to be done for this visit (i.e. 6 mos follow-up), do we have Results/Consult Notes?        •  Labs - Labs ordered, completed and results are in chart.       •  Imaging - Imaging ordered, completed and results are in chart.       •  Referrals - No referrals were ordered at last office visit.    3. Is this appointment scheduled as a Hospital Follow-Up? No    4.  Immunizations were updated in [facility] using [employer]?: Yes       •  [employer] Recommendations: HEPATITIS A  PREVNAR (PCV13)  TDAP ZOSTAVAX (Shingles)    5.  Patient is due for the following Health Maintenance Topics:   Health Maintenance Due   Topic Date Due   • Annual Wellness Visit  1945       - Patient has completed FLU Immunization(s) per [employer]. Chart has been updated.    6.  Patient was not informed to arrive 15 min prior to their scheduled appointment and bring in their medication bottles.

## 2017-06-05 ENCOUNTER — TELEPHONE (OUTPATIENT)
Dept: MEDICAL GROUP | Facility: PHYSICIAN GROUP | Age: 72
End: 2017-06-05

## 2017-06-05 ENCOUNTER — HOSPITAL ENCOUNTER (OUTPATIENT)
Dept: RADIOLOGY | Facility: MEDICAL CENTER | Age: 72
End: 2017-06-05
Attending: NURSE PRACTITIONER
Payer: MEDICARE

## 2017-06-05 ENCOUNTER — OFFICE VISIT (OUTPATIENT)
Dept: MEDICAL GROUP | Facility: PHYSICIAN GROUP | Age: 72
End: 2017-06-05
Payer: MEDICARE

## 2017-06-05 VITALS
HEIGHT: 65 IN | SYSTOLIC BLOOD PRESSURE: 110 MMHG | RESPIRATION RATE: 16 BRPM | HEART RATE: 86 BPM | OXYGEN SATURATION: 92 % | TEMPERATURE: 98.4 F | BODY MASS INDEX: 27.16 KG/M2 | WEIGHT: 163 LBS | DIASTOLIC BLOOD PRESSURE: 74 MMHG

## 2017-06-05 DIAGNOSIS — J44.9 CHRONIC OBSTRUCTIVE PULMONARY DISEASE, UNSPECIFIED COPD TYPE (HCC): ICD-10-CM

## 2017-06-05 DIAGNOSIS — M81.0 OSTEOPOROSIS WITHOUT CURRENT PATHOLOGICAL FRACTURE, UNSPECIFIED OSTEOPOROSIS TYPE: ICD-10-CM

## 2017-06-05 DIAGNOSIS — M79.89 SWELLING OF RIGHT LITTLE FINGER: ICD-10-CM

## 2017-06-05 DIAGNOSIS — J30.1 SEASONAL ALLERGIC RHINITIS DUE TO POLLEN: ICD-10-CM

## 2017-06-05 PROCEDURE — 4040F PNEUMOC VAC/ADMIN/RCVD: CPT | Mod: 8P | Performed by: NURSE PRACTITIONER

## 2017-06-05 PROCEDURE — G8432 DEP SCR NOT DOC, RNG: HCPCS | Performed by: NURSE PRACTITIONER

## 2017-06-05 PROCEDURE — 99214 OFFICE O/P EST MOD 30 MIN: CPT | Performed by: NURSE PRACTITIONER

## 2017-06-05 PROCEDURE — 3014F SCREEN MAMMO DOC REV: CPT | Performed by: NURSE PRACTITIONER

## 2017-06-05 PROCEDURE — 77077 JOINT SURVEY SINGLE VIEW: CPT

## 2017-06-05 PROCEDURE — G8419 CALC BMI OUT NRM PARAM NOF/U: HCPCS | Performed by: NURSE PRACTITIONER

## 2017-06-05 PROCEDURE — 1036F TOBACCO NON-USER: CPT | Performed by: NURSE PRACTITIONER

## 2017-06-05 PROCEDURE — 3288F FALL RISK ASSESSMENT DOCD: CPT | Performed by: NURSE PRACTITIONER

## 2017-06-05 PROCEDURE — 0518F FALL PLAN OF CARE DOCD: CPT | Mod: 8P | Performed by: NURSE PRACTITIONER

## 2017-06-05 PROCEDURE — 3017F COLORECTAL CA SCREEN DOC REV: CPT | Performed by: NURSE PRACTITIONER

## 2017-06-05 PROCEDURE — 1100F PTFALLS ASSESS-DOCD GE2>/YR: CPT | Performed by: NURSE PRACTITIONER

## 2017-06-05 RX ORDER — ALENDRONATE SODIUM 70 MG/1
70 TABLET ORAL
Qty: 12 TAB | Refills: 3 | Status: SHIPPED | OUTPATIENT
Start: 2017-06-05

## 2017-06-05 RX ORDER — FLUTICASONE PROPIONATE 50 MCG
1 SPRAY, SUSPENSION (ML) NASAL DAILY
Qty: 16 G | Refills: 2 | Status: SHIPPED | OUTPATIENT
Start: 2017-06-05

## 2017-06-05 ASSESSMENT — PAIN SCALES - GENERAL: PAINLEVEL: NO PAIN

## 2017-06-05 NOTE — MR AVS SNAPSHOT
"        Porsche Rodríguezwell   2017 8:40 AM   Office Visit   MRN: [id number]    Department:  [facility]   Dept Phone:  [phone]    Description:  Female : 1945   Provider:  GRICELDA Moses           Reason for Visit     Edema Pink Finger     Headache x 2 weeks       Allergies as of 2017     No Known Allergies      You were diagnosed with     Swelling of right little finger   [2340769]       Seasonal allergic rhinitis due to pollen   [8969321]       Osteoporosis without current pathological fracture, unspecified osteoporosis type   [3722051]         Vital Signs     Blood Pressure Pulse Temperature Respirations Height Weight    110/74 mmHg 86 36.9 °C (98.4 °F) 16 1.651 m (5' 5\") 73.936 kg (163 lb)    Body Mass Index Oxygen Saturation Breastfeeding? Smoking Status          27.12 kg/m2 92% No Former Smoker        Basic Information     Date Of Birth Sex Race Ethnicity Preferred Language    1945 Female White Non- English      Your appointments     2017 10:00 AM   Established Patient with GRICELDA Moses   [facility] (--)    [address]  [address]  [place] [id number] [phone]           You will be receiving a confirmation call a few days before your appointment from our automated call confirmation system.            Sep 11, 2017 10:00 AM   Established Patient with GRICELDA Moses   [facility] (--)    [address]  [address]  [place] [id number] [phone]           You will be receiving a confirmation call a few days before your appointment from our automated call confirmation system.              Problem List              ICD-10-CM Priority Class Noted - Resolved    Chronic obstructive pulmonary disease (CMS-HCC) J44.9   2017 - Present    Obstructive sleep apnea G47.33   2017 - Present    Nocturnal hypoxemia G47.34   2017 - Present    Former tobacco use Z87.891   " 1/9/2017 - Present    Yeast vaginitis B37.3   1/24/2017 - Present    Swelling of right little finger M79.89   6/5/2017 - Present      Health Maintenance        Date Due Completion Dates    COLON CANCER SCREENING ANNUAL FIT 1/1/2018 (Originally 1945) ---    IMM PNEUMOCOCCAL 65+ (ADULT) LOW/MEDIUM RISK SERIES (1 of 2 - PCV13) 1/1/2018 (Originally 12/26/2011) ---    IMM DTaP/Tdap/Td Vaccine (1 - Tdap) 1/1/2018 (Originally 12/26/1965) ---    MAMMOGRAM 5/8/2018 5/8/2017, 5/13/2013, 3/12/2012    BONE DENSITY 5/8/2022 5/8/2017, 3/12/2012            Current Immunizations     Influenza Vaccine Adult HD 10/26/2015    Influenza Vaccine Quad Inj (Pf) 10/21/2014, 10/28/2013    Influenza Vaccine Quad Inj (Preserved) 11/24/2016      Below and/or attached are the medications your provider expects you to take. Review all of your home medications and newly ordered medications with your provider and/or pharmacist. Follow medication instructions as directed by your provider and/or pharmacist. Please keep your medication list with you and share with your provider. Update the information when medications are discontinued, doses are changed, or new medications (including over-the-counter products) are added; and carry medication information at all times in the event of emergency situations     Allergies:  No Known Allergies          Medications  Valid as of: June 05, 2017 -  9:15 AM    Generic Name Brand Name Tablet Size Instructions for use    Albuterol Sulfate (Aero Soln) albuterol 108 (90 BASE) MCG/ACT Inhale 2 Puffs by mouth every four hours as needed for Shortness of Breath.        Alendronate Sodium (Tab) FOSAMAX 70 MG Take 1 Tab by mouth every 7 days.        Beclomethasone Dipropionate (Aero Soln) QVAR 80 MCG/ACT Inhale 2 Puffs by mouth 2 times a day.        Calcium Carbonate (Tab) Calcium Carbonate 600 MG Take  by mouth. 1 tab by mouth weekly        Cholecalciferol (Cap) vitamin D3 5000 UNITS Take 1 Cap by mouth. 1 tab by  HS 7, MD Ramos notified, made aware at patient's bedside mouth weekly        Fluticasone Furoate-Vilanterol (AEROSOL POWDER, BREATH ACTIVATED) Fluticasone Furoate-Vilanterol 100-25 MCG/INH Take 1 Inhalation by mouth every day. Rinse mouth after use.        Fluticasone Propionate (Suspension) FLONASE 50 MCG/ACT Spray 1 Spray in nose every day.        .                 Medicines prescribed today were sent to:     [facility] [address] [place] [address] (), [place] - [address]    [address] () [place] [address]    Phone: [phone] Fax: [phone]    Open 24 Hours?: No      Medication refill instructions:       If your prescription bottle indicates you have medication refills left, it is not necessary to call your provider’s office. Please contact your pharmacy and they will refill your medication.    If your prescription bottle indicates you do not have any refills left, you may request refills at any time through one of the following ways: The online [employer] system (except Urgent Care), by calling your provider’s office, or by asking your pharmacy to contact your provider’s office with a refill request. Medication refills are processed only during regular business hours and may not be available until the next business day. Your provider may request additional information or to have a follow-up visit with you prior to refilling your medication.   *Please Note: Medication refills are assigned a new Rx number when refilled electronically. Your pharmacy may indicate that no refills were authorized even though a new prescription for the same medication is available at the pharmacy. Please request the medicine by name with the pharmacy before contacting your provider for a refill.        Your To Do List     Future Labs/Procedures Complete By Expires    DX-JOINT SURVEY-HANDS SINGLE VIEW  As directed 6/5/2018      Other Notes About Your Plan     [facility]. [phone] Fax. [phone]             [employer] Access Code: Activation code not generated  Current  MyChart Status: Active

## 2017-06-05 NOTE — TELEPHONE ENCOUNTER
----- Message from GRICELDA Moses sent at 6/5/2017 11:44 AM PDT -----  Please call pt and give lab results: X-rays consistent with osteoarthritis, no evidence of fracture :)

## 2017-06-05 NOTE — ASSESSMENT & PLAN NOTE
"Chronic in nature. New to this provider. States she has noticed random swelling of her PIP on her right pinky finger. 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 Take ibuprofen or tylenol which makes it \"manageable\". Patient is concerned with possible fracture in the pinky finger specifically as she has noticed that it becomes more swollen than her other fingers. Patient states that nothing specifically makes it worse or better. States that it does not seem to be related to any specific time of day, specific activity. Patient does have a history of repetitive motion with her hands related to her job. Patient states \"it's likely just arthritis\".  "

## 2017-06-05 NOTE — PROGRESS NOTES
"Chief Complaint   Patient presents with   • Edema     Pink Finger    • Headache     x 2 weeks        HISTORY OF PRESENT ILLNESS: Patient is a 71 y.o. female established patient who presents today to discuss multiple issues.    Swelling of right little finger  Chronic in nature. New to this provider. States she has noticed random swelling of her PIP on her right pinky finger. 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 Take ibuprofen or tylenol which makes it \"manageable\". Patient is concerned with possible fracture in the pinky finger specifically as she has noticed that it becomes more swollen than her other fingers. Patient states that nothing specifically makes it worse or better. States that it does not seem to be related to any specific time of day, specific activity. Patient does have a history of repetitive motion with her hands related to her job. Patient states \"it's likely just arthritis\".    Chronic obstructive pulmonary disease (CMS-[facility])  Chronic in nature. Stable. Patient is followed by pulmonology. Patient states she is stable on Prelone and albuterol rescue inhaler. Denies heart palpitations and other side effects of inhalers. States she is doing well denies shortness of breath, wheezing at this time.    Osteoporosis without current pathological fracture  Chronic in nature. Worsened from last scan in 2012. Patient is currently taking calcium and vitamin D. Fosamax 70 mg weekly started at this time. Discussed with patient possible side effects of this medication including body aches, nausea, GERD, esophagitis, etc. Educated patient to take this 30 minutes putting anything else in her stomach to drink water with the medication and to remain upright for at least 30 minutes after taking the medication. Discussed with patient the importance of discussing any side effects with this provider.    Seasonal allergic rhinitis due to pollen  Chronic in nature. Worsening symptoms. Patient is " encouraged to use Flonase one spray each nostril daily to improve symptoms. Patient has not had this in the past. Patient has not taken any over-the-counter allergy medication. Patient states that she is having congestion, itchy watery eyes, headaches. Patient will follow up if symptoms worsen or do not improve. Patient denies shortness of breath, cough, wheezing.      Patient Active Problem List    Diagnosis Date Noted   • Swelling of right little finger 06/05/2017   • Seasonal allergic rhinitis due to pollen 06/05/2017   • Osteoporosis without current pathological fracture 06/05/2017   • Yeast vaginitis 01/24/2017   • Chronic obstructive pulmonary disease (CMS-[facility]) 01/09/2017   • Obstructive sleep apnea 01/09/2017   • Nocturnal hypoxemia 01/09/2017   • Former tobacco use 01/09/2017       Allergies:Review of patient's allergies indicates no known allergies.    Current Outpatient Prescriptions   Medication Sig Dispense Refill   • fluticasone (FLONASE) 50 MCG/ACT nasal spray Spray 1 Spray in nose every day. 16 g 2   • alendronate (FOSAMAX) 70 MG Tab Take 1 Tab by mouth every 7 days. 12 Tab 3   • Fluticasone Furoate-Vilanterol (BREO ELLIPTA) 100-25 MCG/INH AEROSOL POWDER, BREATH ACTIVATED Take 1 Inhalation by mouth every day. Rinse mouth after use. 1 Each 5   • beclomethasone (QVAR) 80 MCG/ACT inhaler Inhale 2 Puffs by mouth 2 times a day. 7.3 g 6   • albuterol (VENTOLIN OR PROVENTIL) 108 (90 BASE) MCG/ACT Aero Soln inhalation aerosol Inhale 2 Puffs by mouth every four hours as needed for Shortness of Breath.     • Cholecalciferol (VITAMIN D3) 5000 UNITS Cap Take 1 Cap by mouth. 1 tab by mouth weekly     • Calcium Carbonate 600 MG Tab Take  by mouth. 1 tab by mouth weekly       No current facility-administered medications for this visit.       Social History   Substance Use Topics   • Smoking status: Former Smoker -- 1 years     Quit date: 10/24/2008   • Smokeless tobacco: Never Used      Comment: started 1961  quit  "   • Alcohol Use: 0.0 oz/week     0 Standard drinks or equivalent per week      Comment: beer and wine - occasionally       Family Status   Relation Status Death Age   • Father       Family History   Problem Relation Age of Onset   • Prostate cancer Father        Review of Systems:   Constitutional:  Negative for fever, chills, weight loss and malaise/fatigue.   HEENT:  Negative for ear pain, nosebleeds, positive congestion, negative sore throat and neck pain.    Eyes:  Negative for blurred vision.   Respiratory:  Negative for cough, sputum production, shortness of breath and wheezing.    Cardiovascular:  Negative for chest pain, palpitations, orthopnea and leg swelling.   Gastrointestinal:  Negative for heartburn, nausea, vomiting and abdominal pain.   Genitourinary:  Negative for dysuria, urgency and frequency.   Musculoskeletal:  Positive for myalgias, back pain and joint pain.   Skin:  Negative for rash and itching.   Neurological:  Negative for dizziness, tingling, tremors, sensory change, focal weakness and headaches.   Endo/Heme/Allergies:  Does not bruise/bleed easily.   Psychiatric/Behavioral:  Negative for depression, suicidal ideas and memory loss.  The patient is not nervous/anxious and does not have insomnia.    All other systems reviewed and are negative except as in HPI.    Exam:  Blood pressure 110/74, pulse 86, temperature 36.9 °C (98.4 °F), resp. rate 16, height 1.651 m (5' 5\"), weight 73.936 kg (163 lb), SpO2 92 %, not currently breastfeeding.  General:  Normal appearing. No distress.  HEENT:  Normocephalic. Eyes conjunctiva clear lids without ptosis, pupils equal and reactive to light accommodation, ears normal shape and contour, canals are clear bilaterally, tympanic membranes are benign, nasal mucosa pale boggy, oropharynx is with erythema, positive cobblestoning.  Neck:  Supple without JVD or bruit. Thyroid is not enlarged.  Pulmonary:  Clear to ausculation.  Normal effort. No " rales, ronchi, or wheezing.   Cardiovascular:  Regular rate and rhythm without murmur. Carotid and radial pulses are intact and equal bilaterally.  Abdomen:  Soft, nontender, nondistended. Normal bowel sounds. Liver and spleen are not palpable.  Neurologic:  Grossly nonfocal  Lymph:  No cervical, supraclavicular or axillary lymph nodes are palpable  Skin:  Warm and dry.  No obvious lesions.  Musculoskeletal:  Normal gait. No extremity cyanosis, clubbing, or edema.  Psych:  Normal mood and affect. Alert and oriented x3. Judgment and insight is normal.      Medical decision-making and discussion: This is a generally well-appearing 71-year-old female patient here today to discuss swelling in her right pinky hand x-ray is ordered at this time which is consistent with osteoarthritis, which patient suspected. Patient will continue to manage this with over-the-counter medications. With regard to osteoporosis patient is started on Fosamax 70 mg weekly with instructions as above. Up-to-date handout regarding this medication is provided at this time. With regard to allergic rhinitis patient will use Flonase one spray each nostril daily and will follow up if symptoms worsen or do not improve.Patient is encouraged to be seen in the emergency room for chest pain, palpitations, shortness of breath, dizziness, severe abdominal pain or other concerning symptoms.      Please note that this dictation was created using voice recognition software. I have made every reasonable attempt to correct obvious errors, but I expect that there are errors of grammar and possibly content that I did not discover before finalizing the note.    Assessment/Plan:  1. Swelling of right little finger  DX-JOINT SURVEY-HANDS SINGLE VIEW   2. Seasonal allergic rhinitis due to pollen  fluticasone (FLONASE) 50 MCG/ACT nasal spray   3. Osteoporosis without current pathological fracture, unspecified osteoporosis type  alendronate (FOSAMAX) 70 MG Tab   4.  Chronic obstructive pulmonary disease, unspecified COPD type (CMS-HCC)            I have placed the below orders and discussed them with an approved delegating provider. The MA is performing the below orders under the direction of Dr. Daniels.

## 2017-06-05 NOTE — ASSESSMENT & PLAN NOTE
Chronic in nature. Worsening symptoms. Patient is encouraged to use Flonase one spray each nostril daily to improve symptoms. Patient has not had this in the past. Patient has not taken any over-the-counter allergy medication. Patient states that she is having congestion, itchy watery eyes, headaches. Patient will follow up if symptoms worsen or do not improve. Patient denies shortness of breath, cough, wheezing.

## 2017-06-05 NOTE — ASSESSMENT & PLAN NOTE
Chronic in nature. Stable. Patient is followed by pulmonology. Patient states she is stable on Prelone and albuterol rescue inhaler. Denies heart palpitations and other side effects of inhalers. States she is doing well denies shortness of breath, wheezing at this time.

## 2017-06-05 NOTE — ASSESSMENT & PLAN NOTE
Chronic in nature. Worsened from last scan in 2012. Patient is currently taking calcium and vitamin D. Fosamax 70 mg weekly started at this time. Discussed with patient possible side effects of this medication including body aches, nausea, GERD, esophagitis, etc. Educated patient to take this 30 minutes putting anything else in her stomach to drink water with the medication and to remain upright for at least 30 minutes after taking the medication. Discussed with patient the importance of discussing any side effects with this provider.

## 2017-07-10 ENCOUNTER — HOSPITAL ENCOUNTER (OUTPATIENT)
Facility: MEDICAL CENTER | Age: 72
End: 2017-07-10
Attending: NURSE PRACTITIONER
Payer: MEDICARE

## 2017-07-10 ENCOUNTER — OFFICE VISIT (OUTPATIENT)
Dept: MEDICAL GROUP | Facility: PHYSICIAN GROUP | Age: 72
End: 2017-07-10
Payer: MEDICARE

## 2017-07-10 VITALS
OXYGEN SATURATION: 95 % | TEMPERATURE: 98.3 F | HEIGHT: 65 IN | SYSTOLIC BLOOD PRESSURE: 106 MMHG | BODY MASS INDEX: 27.16 KG/M2 | DIASTOLIC BLOOD PRESSURE: 64 MMHG | HEART RATE: 68 BPM | WEIGHT: 163 LBS | RESPIRATION RATE: 16 BRPM

## 2017-07-10 DIAGNOSIS — N30.01 ACUTE CYSTITIS WITH HEMATURIA: ICD-10-CM

## 2017-07-10 PROBLEM — N30.00 ACUTE CYSTITIS WITHOUT HEMATURIA: Status: ACTIVE | Noted: 2017-07-10

## 2017-07-10 LAB
APPEARANCE UR: CLEAR
BILIRUB UR STRIP-MCNC: NORMAL MG/DL
COLOR UR AUTO: YELLOW
GLUCOSE UR STRIP.AUTO-MCNC: NORMAL MG/DL
KETONES UR STRIP.AUTO-MCNC: NORMAL MG/DL
LEUKOCYTE ESTERASE UR QL STRIP.AUTO: NORMAL
NITRITE UR QL STRIP.AUTO: NORMAL
PH UR STRIP.AUTO: 6.5 [PH] (ref 5–8)
PROT UR QL STRIP: NORMAL MG/DL
RBC UR QL AUTO: NORMAL
SP GR UR STRIP.AUTO: 1.02
UROBILINOGEN UR STRIP-MCNC: NORMAL MG/DL

## 2017-07-10 PROCEDURE — 99214 OFFICE O/P EST MOD 30 MIN: CPT | Performed by: NURSE PRACTITIONER

## 2017-07-10 PROCEDURE — 81002 URINALYSIS NONAUTO W/O SCOPE: CPT | Performed by: NURSE PRACTITIONER

## 2017-07-10 PROCEDURE — 87086 URINE CULTURE/COLONY COUNT: CPT

## 2017-07-10 RX ORDER — CEFDINIR 300 MG/1
300 CAPSULE ORAL EVERY 12 HOURS
Qty: 10 CAP | Refills: 0 | Status: SHIPPED | OUTPATIENT
Start: 2017-07-10 | End: 2017-07-15

## 2017-07-10 ASSESSMENT — PAIN SCALES - GENERAL: PAINLEVEL: 2=MINIMAL-SLIGHT

## 2017-07-10 NOTE — MR AVS SNAPSHOT
"        Porsche Josselyn   7/10/2017 1:00 PM   Office Visit   MRN: [id number]    Department:  [facility]   Dept Phone:  [phone]    Description:  Female : 1945   Provider:  GRICELDA Moses           Reason for Visit     UTI x 4 days       Allergies as of 7/10/2017     No Known Allergies      You were diagnosed with     Acute cystitis with hematuria   [832225]         Vital Signs     Blood Pressure Pulse Temperature Respirations Height Weight    106/64 mmHg 68 36.8 °C (98.3 °F) 16 1.651 m (5' 5\") 73.936 kg (163 lb)    Body Mass Index Oxygen Saturation Breastfeeding? Smoking Status          27.12 kg/m2 95% No Former Smoker        Basic Information     Date Of Birth Sex Race Ethnicity Preferred Language    1945 Female White Non- English      Your appointments     Sep 11, 2017 10:00 AM   Established Patient with GRICELDA Moses   [facility] (--)    [address]  [address]  [place] [id number] [phone]           You will be receiving a confirmation call a few days before your appointment from our automated call confirmation system.            Dec 11, 2017 10:20 AM   ANNUAL WELLNESS with GRICELDA Moses, [facility]    [facility] (--)    [address]  [address]  [place] [id number] [phone]              Problem List              ICD-10-CM Priority Class Noted - Resolved    Chronic obstructive pulmonary disease (CMS-HCC) J44.9   2017 - Present    Obstructive sleep apnea G47.33   2017 - Present    Nocturnal hypoxemia G47.34   2017 - Present    Former tobacco use Z87.891   2017 - Present    Yeast vaginitis B37.3   2017 - Present    Swelling of right little finger M79.89   2017 - Present    Seasonal allergic rhinitis due to pollen J30.1   2017 - Present    Osteoporosis without current pathological fracture M81.0   2017 - Present    Acute cystitis without " hematuria [N30.00] N30.00   7/10/2017 - Present      Health Maintenance        Date Due Completion Dates    COLON CANCER SCREENING ANNUAL FIT 1/1/2018 (Originally 1945) ---    IMM PNEUMOCOCCAL 65+ (ADULT) LOW/MEDIUM RISK SERIES (1 of 2 - PCV13) 1/1/2018 (Originally 12/26/2011) ---    IMM DTaP/Tdap/Td Vaccine (1 - Tdap) 1/1/2018 (Originally 12/26/1965) ---    IMM INFLUENZA (1) 9/1/2017 11/24/2016, 10/26/2015, 10/21/2014, 10/28/2013    MAMMOGRAM 5/8/2018 5/8/2017, 5/13/2013, 3/12/2012    BONE DENSITY 5/8/2022 5/8/2017, 3/12/2012            Current Immunizations     Influenza Vaccine Adult HD 10/26/2015    Influenza Vaccine Quad Inj (Pf) 10/21/2014, 10/28/2013    Influenza Vaccine Quad Inj (Preserved) 11/24/2016      Below and/or attached are the medications your provider expects you to take. Review all of your home medications and newly ordered medications with your provider and/or pharmacist. Follow medication instructions as directed by your provider and/or pharmacist. Please keep your medication list with you and share with your provider. Update the information when medications are discontinued, doses are changed, or new medications (including over-the-counter products) are added; and carry medication information at all times in the event of emergency situations     Allergies:  No Known Allergies          Medications  Valid as of: July 10, 2017 -  1:26 PM    Generic Name Brand Name Tablet Size Instructions for use    Albuterol Sulfate (Aero Soln) albuterol 108 (90 BASE) MCG/ACT Inhale 2 Puffs by mouth every four hours as needed for Shortness of Breath.        Alendronate Sodium (Tab) FOSAMAX 70 MG Take 1 Tab by mouth every 7 days.        Beclomethasone Dipropionate (Aero Soln) QVAR 80 MCG/ACT Inhale 2 Puffs by mouth 2 times a day.        Calcium Carbonate (Tab) Calcium Carbonate 600 MG Take  by mouth. 1 tab by mouth weekly        Cefdinir (Cap) OMNICEF 300 MG Take 1 Cap by mouth every 12 hours for 5 days.         Cholecalciferol (Cap) vitamin D3 5000 UNITS Take 1 Cap by mouth. 1 tab by mouth weekly        Fluticasone Furoate-Vilanterol (AEROSOL POWDER, BREATH ACTIVATED) Fluticasone Furoate-Vilanterol 100-25 MCG/INH Take 1 Inhalation by mouth every day. Rinse mouth after use.        Fluticasone Propionate (Suspension) FLONASE 50 MCG/ACT Spray 1 Spray in nose every day.        .                 Medicines prescribed today were sent to:     [facility] [address] [place] [address] (), [place] - [address]    [address] () [place] [address]    Phone: [phone] Fax: [phone]    Open 24 Hours?: No      Medication refill instructions:       If your prescription bottle indicates you have medication refills left, it is not necessary to call your provider’s office. Please contact your pharmacy and they will refill your medication.    If your prescription bottle indicates you do not have any refills left, you may request refills at any time through one of the following ways: The online [employer] system (except Urgent Care), by calling your provider’s office, or by asking your pharmacy to contact your provider’s office with a refill request. Medication refills are processed only during regular business hours and may not be available until the next business day. Your provider may request additional information or to have a follow-up visit with you prior to refilling your medication.   *Please Note: Medication refills are assigned a new Rx number when refilled electronically. Your pharmacy may indicate that no refills were authorized even though a new prescription for the same medication is available at the pharmacy. Please request the medicine by name with the pharmacy before contacting your provider for a refill.        Your To Do List     Future Labs/Procedures Complete By Expires    Urine Culture  As directed 7/10/2018      Instructions      Urinary Tract Infection  A urinary tract infection (UTI) can occur any place along  the urinary tract. The tract includes the kidneys, ureters, bladder, and urethra. A type of germ called bacteria often causes a UTI. UTIs are often helped with antibiotic medicine.   HOME CARE   · If given, take antibiotics as told by your doctor. Finish them even if you start to feel better.  · Drink enough fluids to keep your pee (urine) clear or pale yellow.  · Avoid tea, drinks with caffeine, and bubbly (carbonated) drinks.  · Pee often. Avoid holding your pee in for a long time.  · Pee before and after having sex (intercourse).  · Wipe from front to back after you poop (bowel movement) if you are a woman. Use each tissue only once.  GET HELP RIGHT AWAY IF:   · You have back pain.  · You have lower belly (abdominal) pain.  · You have chills.  · You feel sick to your stomach (nauseous).  · You throw up (vomit).  · Your burning or discomfort with peeing does not go away.  · You have a fever.  · Your symptoms are not better in 3 days.  MAKE SURE YOU:   · Understand these instructions.  · Will watch your condition.  · Will get help right away if you are not doing well or get worse.     This information is not intended to replace advice given to you by your health care provider. Make sure you discuss any questions you have with your health care provider.     Document Released: 06/05/2009 Document Revised: 01/08/2016 Document Reviewed: 07/18/2013  [employer] Interactive Patient Education ©2016 [employer]         Other Notes About Your Plan     [facility] Ph. [phone] Fax. [phone]             [employer] Access Code: Activation code not generated  Current Pretty in my Pocket (PRIMP) Status: Active

## 2017-07-10 NOTE — ASSESSMENT & PLAN NOTE
This is a new problem. Symptoms started Friday. Patient states that she is having positive frequency, burning, itching of her urethra. Patient has not noticed any diarrhea, nausea, vomiting, back pain, suprapubic pain. Patient denies fever, chills.

## 2017-07-10 NOTE — PATIENT INSTRUCTIONS
Urinary Tract Infection  A urinary tract infection (UTI) can occur any place along the urinary tract. The tract includes the kidneys, ureters, bladder, and urethra. A type of germ called bacteria often causes a UTI. UTIs are often helped with antibiotic medicine.   HOME CARE   · If given, take antibiotics as told by your doctor. Finish them even if you start to feel better.  · Drink enough fluids to keep your pee (urine) clear or pale yellow.  · Avoid tea, drinks with caffeine, and bubbly (carbonated) drinks.  · Pee often. Avoid holding your pee in for a long time.  · Pee before and after having sex (intercourse).  · Wipe from front to back after you poop (bowel movement) if you are a woman. Use each tissue only once.  GET HELP RIGHT AWAY IF:   · You have back pain.  · You have lower belly (abdominal) pain.  · You have chills.  · You feel sick to your stomach (nauseous).  · You throw up (vomit).  · Your burning or discomfort with peeing does not go away.  · You have a fever.  · Your symptoms are not better in 3 days.  MAKE SURE YOU:   · Understand these instructions.  · Will watch your condition.  · Will get help right away if you are not doing well or get worse.     This information is not intended to replace advice given to you by your health care provider. Make sure you discuss any questions you have with your health care provider.     Document Released: 06/05/2009 Document Revised: 01/08/2016 Document Reviewed: 07/18/2013  [employer] Interactive Patient Education ©2016 [employer]

## 2017-07-10 NOTE — PROGRESS NOTES
Chief Complaint   Patient presents with   • UTI     x 4 days        HISTORY OF PRESENT ILLNESS: Patient is a 71 y.o. female established patient who presents today to discuss UTI.    Acute cystitis without hematuria [N30.00]  This is a new problem. Symptoms started Friday. Patient states that she is having positive frequency, burning, itching of her urethra. Patient has not noticed any diarrhea, nausea, vomiting, back pain, suprapubic pain. Patient denies fever, chills.        Patient Active Problem List    Diagnosis Date Noted   • Acute cystitis without hematuria [N30.00] 07/10/2017   • Swelling of right little finger 06/05/2017   • Seasonal allergic rhinitis due to pollen 06/05/2017   • Osteoporosis without current pathological fracture 06/05/2017   • Yeast vaginitis 01/24/2017   • Chronic obstructive pulmonary disease ([facility]) 01/09/2017   • Obstructive sleep apnea 01/09/2017   • Nocturnal hypoxemia 01/09/2017   • Former tobacco use 01/09/2017       Allergies:Review of patient's allergies indicates no known allergies.    Current Outpatient Prescriptions   Medication Sig Dispense Refill   • cefdinir (OMNICEF) 300 MG Cap Take 1 Cap by mouth every 12 hours for 5 days. 10 Cap 0   • fluticasone (FLONASE) 50 MCG/ACT nasal spray Spray 1 Spray in nose every day. 16 g 2   • alendronate (FOSAMAX) 70 MG Tab Take 1 Tab by mouth every 7 days. 12 Tab 3   • Fluticasone Furoate-Vilanterol (BREO ELLIPTA) 100-25 MCG/INH AEROSOL POWDER, BREATH ACTIVATED Take 1 Inhalation by mouth every day. Rinse mouth after use. 1 Each 5   • beclomethasone (QVAR) 80 MCG/ACT inhaler Inhale 2 Puffs by mouth 2 times a day. 7.3 g 6   • albuterol (VENTOLIN OR PROVENTIL) 108 (90 BASE) MCG/ACT Aero Soln inhalation aerosol Inhale 2 Puffs by mouth every four hours as needed for Shortness of Breath.     • Cholecalciferol (VITAMIN D3) 5000 UNITS Cap Take 1 Cap by mouth. 1 tab by mouth weekly     • Calcium Carbonate 600 MG Tab Take  by mouth. 1 tab by mouth  "weekly       No current facility-administered medications for this visit.       Social History   Substance Use Topics   • Smoking status: Former Smoker -- 1 years     Quit date: 10/24/2008   • Smokeless tobacco: Never Used      Comment: started   quit    • Alcohol Use: 0.0 oz/week     0 Standard drinks or equivalent per week      Comment: beer and wine - occasionally       Family Status   Relation Status Death Age   • Father       Family History   Problem Relation Age of Onset   • Prostate cancer Father        Review of Systems:   Constitutional:  Negative for fever, chills, weight loss and malaise/fatigue.   HEENT:  Negative for ear pain, nosebleeds, congestion, sore throat and neck pain.    Eyes:  Negative for blurred vision.   Respiratory:  Negative for cough, sputum production, shortness of breath and wheezing.    Cardiovascular:  Negative for chest pain, palpitations, orthopnea and leg swelling.   Gastrointestinal:  Negative for heartburn, nausea, vomiting and abdominal pain.   Genitourinary: Positive for dysuria, urgency and frequency.   Musculoskeletal:  Negative for myalgias, back pain and joint pain.   Skin:  Negative for rash and itching.   Neurological:  Negative for dizziness, tingling, tremors, sensory change, focal weakness and headaches.   Endo/Heme/Allergies:  Does not bruise/bleed easily.   Psychiatric/Behavioral:  Negative for depression, suicidal ideas and memory loss.  The patient is not nervous/anxious and does not have insomnia.    All other systems reviewed and are negative except as in HPI.    Exam:  Blood pressure 106/64, pulse 68, temperature 36.8 °C (98.3 °F), resp. rate 16, height 1.651 m (5' 5\"), weight 73.936 kg (163 lb), SpO2 95 %, not currently breastfeeding.  General:  Normal appearing. No distress.  Pulmonary:  Clear to ausculation.  Normal effort. No rales, ronchi, or wheezing.  Cardiovascular:  Regular rate and rhythm without murmur. Carotid and radial pulses are " intact and equal bilaterally.  Abdomen:  Soft, nontender, nondistended. Normal bowel sounds. Liver and spleen are not palpable. Negative CVA tenderness. Negative suprapubic tenderness.  Neurologic:  Grossly nonfocal  Skin:  Warm and dry.  No obvious lesions.  Musculoskeletal:  Normal gait. No extremity cyanosis, clubbing, or edema.  Psych:  Normal mood and affect. Alert and oriented x3. Judgment and insight is normal.      Medical decision-making and discussion: Porsche is a generally well-appearing 8-year-old female patient here today to discuss UTI symptoms. Patient is having frequency, urgency, itching, point-of-care urine is obtained at this time. Point-of-care urine positive for large leukocytes, negative for nitrites negative urobilinogen trace protein pH is 6.5 blood moderate specific gravity 1.020 ketones negative bilirubin negative glucose negative. Patient will take cefdinir 300 mg twice daily ×5 days. Declines Pyridium at this time. Patient is encouraged to contact this provider if she has continued symptoms. Urine culture is ordered at this time. Most recent UTI was in February. Patient states she is overall doing well planning to move to [place]. Patient is encouraged to be seen in the emergency room for chest pain, palpitations, shortness of breath, dizziness, severe abdominal pain or other concerning symptoms.      Please note that this dictation was created using voice recognition software. I have made every reasonable attempt to correct obvious errors, but I expect that there are errors of grammar and possibly content that I did not discover before finalizing the note.    Assessment/Plan:  1. Acute cystitis without hematuria [N30.00]  POCT Urinalysis    Urine Culture    cefdinir (OMNICEF) 300 MG Cap          I have placed the below orders and discussed them with an approved delegating provider. The MA is performing the below orders under the direction of Dr. Daniels.

## 2017-07-12 LAB
BACTERIA UR CULT: NORMAL
SIGNIFICANT IND 70042: NORMAL
SOURCE SOURCE: NORMAL

## 2017-07-14 ENCOUNTER — TELEPHONE (OUTPATIENT)
Dept: MEDICAL GROUP | Facility: PHYSICIAN GROUP | Age: 72
End: 2017-07-14

## 2017-07-14 NOTE — TELEPHONE ENCOUNTER
----- Message from GRICELDA Moses sent at 7/13/2017  5:28 PM PDT -----  Please call pt and give lab results: Urine culture is negative.

## 2017-07-14 NOTE — TELEPHONE ENCOUNTER
Phone Number Called: [phone] (home)     Message: Pt notified of results    Left Message for patient to call back: N\A

## 2017-07-28 ENCOUNTER — TELEPHONE (OUTPATIENT)
Dept: PULMONOLOGY | Facility: HOSPICE | Age: 72
End: 2017-07-28

## 2017-07-28 DIAGNOSIS — R09.02 HYPOXEMIA: ICD-10-CM

## 2017-07-28 NOTE — TELEPHONE ENCOUNTER
Please sign for new oxygen order to send to [facility].  Patient has [facility] and needs to be switched out.    Last OV: 01/09/2017 - Claire Mckeon  Next OV: none scheduled    DX: hypoxemia

## 2017-09-18 ENCOUNTER — PATIENT MESSAGE (OUTPATIENT)
Dept: MEDICAL GROUP | Facility: PHYSICIAN GROUP | Age: 72
End: 2017-09-18

## 2017-12-04 ENCOUNTER — TELEPHONE (OUTPATIENT)
Dept: MEDICAL GROUP | Facility: PHYSICIAN GROUP | Age: 72
End: 2017-12-04

## 2018-07-20 ENCOUNTER — HOSPITAL ENCOUNTER (OUTPATIENT)
Dept: HOSPITAL 95 - ORSCMMR | Age: 73
Discharge: HOME | End: 2018-07-20
Attending: INTERNAL MEDICINE
Payer: MEDICARE

## 2018-07-20 VITALS — BODY MASS INDEX: 27.49 KG/M2 | HEIGHT: 65 IN | WEIGHT: 164.99 LBS

## 2018-07-20 DIAGNOSIS — G47.33: ICD-10-CM

## 2018-07-20 DIAGNOSIS — Z79.899: ICD-10-CM

## 2018-07-20 DIAGNOSIS — R91.8: Primary | ICD-10-CM

## 2018-07-20 DIAGNOSIS — Z99.81: ICD-10-CM

## 2018-07-20 DIAGNOSIS — Z87.891: ICD-10-CM

## 2018-07-20 DIAGNOSIS — R05: ICD-10-CM

## 2018-07-20 DIAGNOSIS — C34.2: ICD-10-CM

## 2018-07-20 PROCEDURE — 0B9D8ZX DRAINAGE OF RIGHT MIDDLE LUNG LOBE, VIA NATURAL OR ARTIFICIAL OPENING ENDOSCOPIC, DIAGNOSTIC: ICD-10-PCS | Performed by: INTERNAL MEDICINE

## 2018-07-20 PROCEDURE — 0BB68ZX EXCISION OF RIGHT LOWER LOBE BRONCHUS, VIA NATURAL OR ARTIFICIAL OPENING ENDOSCOPIC, DIAGNOSTIC: ICD-10-PCS | Performed by: INTERNAL MEDICINE

## 2018-07-20 PROCEDURE — 0BDD8ZX EXTRACTION OF RIGHT MIDDLE LUNG LOBE, VIA NATURAL OR ARTIFICIAL OPENING ENDOSCOPIC, DIAGNOSTIC: ICD-10-PCS | Performed by: INTERNAL MEDICINE

## 2018-08-11 ENCOUNTER — HOSPITAL ENCOUNTER (INPATIENT)
Dept: HOSPITAL 95 - ER | Age: 73
LOS: 2 days | Discharge: ANOTHER HOSPITAL | DRG: 180 | End: 2018-08-13
Attending: HOSPITALIST | Admitting: HOSPITALIST
Payer: MEDICARE

## 2018-08-11 VITALS — WEIGHT: 158.29 LBS | HEIGHT: 65 IN | BODY MASS INDEX: 26.37 KG/M2

## 2018-08-11 DIAGNOSIS — C34.90: Primary | ICD-10-CM

## 2018-08-11 DIAGNOSIS — Z92.3: ICD-10-CM

## 2018-08-11 DIAGNOSIS — I31.4: ICD-10-CM

## 2018-08-11 DIAGNOSIS — N39.0: ICD-10-CM

## 2018-08-11 DIAGNOSIS — C79.51: ICD-10-CM

## 2018-08-11 DIAGNOSIS — J44.9: ICD-10-CM

## 2018-08-11 DIAGNOSIS — Z87.891: ICD-10-CM

## 2018-08-11 DIAGNOSIS — G93.40: ICD-10-CM

## 2018-08-11 DIAGNOSIS — Z99.81: ICD-10-CM

## 2018-08-11 DIAGNOSIS — J18.9: ICD-10-CM

## 2018-08-11 DIAGNOSIS — E87.1: ICD-10-CM

## 2018-08-11 DIAGNOSIS — E86.0: ICD-10-CM

## 2018-08-11 LAB
BASOPHILS # BLD AUTO: 0.03 K/MM3 (ref 0–0.23)
BASOPHILS NFR BLD AUTO: 0 % (ref 0–2)
DEPRECATED RDW RBC AUTO: 41.5 FL (ref 35.1–46.3)
EOSINOPHIL # BLD AUTO: 0.06 K/MM3 (ref 0–0.68)
EOSINOPHIL NFR BLD AUTO: 0 % (ref 0–6)
ERYTHROCYTE [DISTWIDTH] IN BLOOD BY AUTOMATED COUNT: 13 % (ref 11.7–14.2)
HCT VFR BLD AUTO: 42.2 % (ref 33–51)
HGB BLD-MCNC: 14.5 G/DL (ref 11.5–16)
IMM GRANULOCYTES # BLD AUTO: 0.51 K/MM3 (ref 0–0.1)
IMM GRANULOCYTES NFR BLD AUTO: 2 % (ref 0–1)
LYMPHOCYTES # BLD AUTO: 0.87 K/MM3 (ref 0.84–5.2)
LYMPHOCYTES NFR BLD AUTO: 4 % (ref 21–46)
MCHC RBC AUTO-ENTMCNC: 34.4 G/DL (ref 31.5–36.5)
MCV RBC AUTO: 87 FL (ref 80–100)
MONOCYTES # BLD AUTO: 2.15 K/MM3 (ref 0.16–1.47)
MONOCYTES NFR BLD AUTO: 10 % (ref 4–13)
NEUTROPHILS # BLD AUTO: 17.61 K/MM3 (ref 1.96–9.15)
NEUTROPHILS NFR BLD AUTO: 83 % (ref 41–73)
NRBC # BLD AUTO: 0 K/MM3 (ref 0–0.02)
NRBC BLD AUTO-RTO: 0 /100 WBC (ref 0–0.2)
PLATELET # BLD AUTO: 379 K/MM3 (ref 150–400)

## 2018-08-12 LAB
ALBUMIN SERPL BCP-MCNC: 2.6 G/DL (ref 3.4–5)
ALBUMIN/GLOB SERPL: 0.6 {RATIO} (ref 0.8–1.8)
ALT SERPL W P-5'-P-CCNC: 35 U/L (ref 12–78)
ANION GAP SERPL CALCULATED.4IONS-SCNC: 14 MMOL/L (ref 6–16)
AST SERPL W P-5'-P-CCNC: 26 U/L (ref 12–37)
BILIRUB SERPL-MCNC: 0.8 MG/DL (ref 0.1–1)
BUN SERPL-MCNC: 20 MG/DL (ref 8–24)
CALCIUM SERPL-MCNC: 8.1 MG/DL (ref 8.5–10.1)
CHLORIDE SERPL-SCNC: 87 MMOL/L (ref 98–108)
CK SERPL-CCNC: 56 U/L (ref 26–193)
CK SERPL-CCNC: 56 U/L (ref 26–193)
CO2 SERPL-SCNC: 23 MMOL/L (ref 21–32)
CREAT SERPL-MCNC: 0.44 MG/DL (ref 0.4–1)
GLOBULIN SER CALC-MCNC: 4.2 G/DL (ref 2.2–4)
GLUCOSE SERPL-MCNC: 132 MG/DL (ref 70–99)
KETONES UR STRIP-MCNC: (no result) MG/DL
LEUKOCYTE ESTERASE UR QL STRIP: (no result)
POTASSIUM SERPL-SCNC: 4.2 MMOL/L (ref 3.5–5.5)
PROT SERPL-MCNC: 6.8 G/DL (ref 6.4–8.2)
PROT UR STRIP-MCNC: (no result) MG/DL
RBC #/AREA URNS HPF: (no result) /HPF (ref 0–2)
SODIUM SERPL-SCNC: 124 MMOL/L (ref 136–145)
SP GR SPEC: 1.01 (ref 1–1.02)
TROPONIN I SERPL-MCNC: <0.015 NG/ML (ref 0–0.04)
TROPONIN I SERPL-MCNC: <0.015 NG/ML (ref 0–0.04)
UROBILINOGEN UR STRIP-MCNC: (no result) MG/DL
WBC #/AREA URNS HPF: (no result) /HPF (ref 0–5)

## 2018-08-13 LAB
ALBUMIN SERPL BCP-MCNC: 2.1 G/DL (ref 3.4–5)
ALBUMIN/GLOB SERPL: 0.6 {RATIO} (ref 0.8–1.8)
ALT SERPL W P-5'-P-CCNC: 65 U/L (ref 12–78)
ANION GAP SERPL CALCULATED.4IONS-SCNC: 11 MMOL/L (ref 6–16)
AST SERPL W P-5'-P-CCNC: 41 U/L (ref 12–37)
BASOPHILS # BLD AUTO: 0.03 K/MM3 (ref 0–0.23)
BASOPHILS NFR BLD AUTO: 0 % (ref 0–2)
BILIRUB SERPL-MCNC: 0.5 MG/DL (ref 0.1–1)
BUN SERPL-MCNC: 14 MG/DL (ref 8–24)
CALCIUM SERPL-MCNC: 7.6 MG/DL (ref 8.5–10.1)
CHLORIDE SERPL-SCNC: 93 MMOL/L (ref 98–108)
CO2 SERPL-SCNC: 24 MMOL/L (ref 21–32)
CREAT SERPL-MCNC: 0.5 MG/DL (ref 0.4–1)
DEPRECATED RDW RBC AUTO: 41.3 FL (ref 35.1–46.3)
EOSINOPHIL # BLD AUTO: 0 K/MM3 (ref 0–0.68)
EOSINOPHIL NFR BLD AUTO: 0 % (ref 0–6)
ERYTHROCYTE [DISTWIDTH] IN BLOOD BY AUTOMATED COUNT: 13.1 % (ref 11.7–14.2)
GLOBULIN SER CALC-MCNC: 3.8 G/DL (ref 2.2–4)
GLUCOSE SERPL-MCNC: 89 MG/DL (ref 70–99)
HCT VFR BLD AUTO: 37.9 % (ref 33–51)
HGB BLD-MCNC: 12.8 G/DL (ref 11.5–16)
IMM GRANULOCYTES # BLD AUTO: 0.32 K/MM3 (ref 0–0.1)
IMM GRANULOCYTES NFR BLD AUTO: 2 % (ref 0–1)
LYMPHOCYTES # BLD AUTO: 0.34 K/MM3 (ref 0.84–5.2)
LYMPHOCYTES NFR BLD AUTO: 2 % (ref 21–46)
MAGNESIUM SERPL-MCNC: 2.3 MG/DL (ref 1.6–2.4)
MCHC RBC AUTO-ENTMCNC: 33.8 G/DL (ref 31.5–36.5)
MCV RBC AUTO: 88 FL (ref 80–100)
MONOCYTES # BLD AUTO: 1.44 K/MM3 (ref 0.16–1.47)
MONOCYTES NFR BLD AUTO: 9 % (ref 4–13)
NEUTROPHILS # BLD AUTO: 13.52 K/MM3 (ref 1.96–9.15)
NEUTROPHILS NFR BLD AUTO: 86 % (ref 41–73)
NRBC # BLD AUTO: 0 K/MM3 (ref 0–0.02)
NRBC BLD AUTO-RTO: 0 /100 WBC (ref 0–0.2)
PLATELET # BLD AUTO: 279 K/MM3 (ref 150–400)
POTASSIUM SERPL-SCNC: 4.4 MMOL/L (ref 3.5–5.5)
PROT SERPL-MCNC: 5.9 G/DL (ref 6.4–8.2)
SODIUM SERPL-SCNC: 128 MMOL/L (ref 136–145)

## 2021-01-15 DIAGNOSIS — Z23 NEED FOR VACCINATION: ICD-10-CM
